# Patient Record
Sex: MALE | Race: WHITE | NOT HISPANIC OR LATINO | Employment: FULL TIME | URBAN - METROPOLITAN AREA
[De-identification: names, ages, dates, MRNs, and addresses within clinical notes are randomized per-mention and may not be internally consistent; named-entity substitution may affect disease eponyms.]

---

## 2023-10-05 ENCOUNTER — OFFICE VISIT (OUTPATIENT)
Dept: PRIMARY CARE | Facility: CLINIC | Age: 58
End: 2023-10-05
Payer: COMMERCIAL

## 2023-10-05 ENCOUNTER — HOSPITAL ENCOUNTER (OUTPATIENT)
Dept: VASCULAR MEDICINE | Facility: HOSPITAL | Age: 58
Discharge: HOME | End: 2023-10-05
Payer: COMMERCIAL

## 2023-10-05 VITALS — WEIGHT: 258.8 LBS | BODY MASS INDEX: 41.15 KG/M2 | SYSTOLIC BLOOD PRESSURE: 130 MMHG | DIASTOLIC BLOOD PRESSURE: 84 MMHG

## 2023-10-05 DIAGNOSIS — M79.89 SWELLING OF EXTREMITY, LEFT: Primary | ICD-10-CM

## 2023-10-05 DIAGNOSIS — M79.89 SWELLING OF EXTREMITY, LEFT: ICD-10-CM

## 2023-10-05 PROBLEM — E78.5 ELEVATED LIPIDS: Status: ACTIVE | Noted: 2023-10-05

## 2023-10-05 PROBLEM — B02.9 SHINGLES RASH: Status: ACTIVE | Noted: 2023-10-05

## 2023-10-05 PROBLEM — M10.9 GOUT: Status: ACTIVE | Noted: 2023-10-05

## 2023-10-05 PROBLEM — R79.89 LOW SERUM TESTOSTERONE: Status: ACTIVE | Noted: 2023-10-05

## 2023-10-05 PROBLEM — R06.83 SNORING: Status: ACTIVE | Noted: 2023-10-05

## 2023-10-05 PROBLEM — H91.90 HEARING LOSS: Status: ACTIVE | Noted: 2023-10-05

## 2023-10-05 PROBLEM — M19.079 OSTEOARTHRITIS OF SUBTALAR JOINT: Status: ACTIVE | Noted: 2023-10-05

## 2023-10-05 PROBLEM — M76.829 PTTD (POSTERIOR TIBIAL TENDON DYSFUNCTION): Status: ACTIVE | Noted: 2023-10-05

## 2023-10-05 PROBLEM — M77.9 BONY SPUR: Status: ACTIVE | Noted: 2023-10-05

## 2023-10-05 PROBLEM — R12 HEARTBURN: Status: ACTIVE | Noted: 2023-10-05

## 2023-10-05 PROBLEM — K11.20 PAROTIDITIS: Status: ACTIVE | Noted: 2023-10-05

## 2023-10-05 PROBLEM — M25.579 ANKLE PAIN: Status: ACTIVE | Noted: 2023-10-05

## 2023-10-05 PROBLEM — M19.079 ARTHRITIS OF FOOT: Status: ACTIVE | Noted: 2023-10-05

## 2023-10-05 PROBLEM — R09.81 CONGESTED NOSE: Status: ACTIVE | Noted: 2023-10-05

## 2023-10-05 PROBLEM — H90.3 HEARING LOSS, SENSORINEURAL, ASYMMETRICAL: Status: ACTIVE | Noted: 2023-10-05

## 2023-10-05 PROCEDURE — 99215 OFFICE O/P EST HI 40 MIN: CPT | Performed by: INTERNAL MEDICINE

## 2023-10-05 PROCEDURE — 93971 EXTREMITY STUDY: CPT | Performed by: SURGERY

## 2023-10-05 PROCEDURE — 93971 EXTREMITY STUDY: CPT

## 2023-10-05 RX ORDER — APIXABAN 5 MG (74)
KIT ORAL
Qty: 74 TABLET | Refills: 0 | Status: SHIPPED | OUTPATIENT
Start: 2023-10-05

## 2023-10-05 RX ORDER — ATORVASTATIN CALCIUM 10 MG/1
1 TABLET, FILM COATED ORAL DAILY
COMMUNITY
Start: 2017-09-22 | End: 2024-03-07 | Stop reason: SDUPTHER

## 2023-10-05 NOTE — PROGRESS NOTES
Subjective   Patient ID: Rubio Huerta is a 58 y.o. male who presents for Knee Pain and Leg Pain.    HPI     LLE swelling x 2 weeks    LLE pain    Travel to [place] 2-3 weeks ago    Review of Systems      No Fever/chills/headaches/dizziness/chest pains/ cough/ shortness of breath/palpitations/Nausea/vomiting/diarrhea/ constipation/urine frequency/blood in urine.      Objective   Wt 117 kg (258 lb 12.8 oz)   BMI 41.15 kg/m²     POX=95%    Physical Exam    No JVP elevation. No palpable Lymph Nodes. No Thyromegaly    HEENT- Negative    CVS-NL S1/S2 . No MRG    Lungs-CTA. B/S= B/L    Abdomen-Soft, Non-tender. No masses or HSM    Extremities: LLE swelling and calf tenderness      Assessment/Plan     LLE  pain & swelling x 2 weeks    DDX:    DVT/Thrombphlebitis    MSK    Plan:    Venous U/s LLE-R/o DVT    Elevation    ? ACT Rx    Follow up/ Call with any concerns    Addendum: Ultrasound left lower extremity consistent with DVT, involving the femoral and popliteal vein.  Start Eliquis 10 mg p.o. twice daily x1 week, and then Eliquis 5 mg twice daily for 3 months duration.  Will discuss with vascular surgery regarding further work-up, including follow-up ultrasound study prior to travel.

## 2023-10-06 ENCOUNTER — TELEPHONE (OUTPATIENT)
Dept: PRIMARY CARE | Facility: CLINIC | Age: 58
End: 2023-10-06
Payer: COMMERCIAL

## 2023-10-06 DIAGNOSIS — I82.412 ACUTE DEEP VEIN THROMBOSIS (DVT) OF FEMORAL VEIN OF LEFT LOWER EXTREMITY (MULTI): Primary | ICD-10-CM

## 2023-10-06 NOTE — TELEPHONE ENCOUNTER
Outgoing Rubio Huerta (Self) [phone] (Mobile) Remove   T/c with PT regarding DVT Dx- Started on Eliquis yesterday. Recommend U/s LLE in 2 weeks prior to travel. Discussed with vascular surgery yesterday.

## 2023-10-13 ENCOUNTER — APPOINTMENT (OUTPATIENT)
Dept: PRIMARY CARE | Facility: CLINIC | Age: 58
End: 2023-10-13
Payer: COMMERCIAL

## 2023-10-18 ENCOUNTER — HOSPITAL ENCOUNTER (OUTPATIENT)
Dept: VASCULAR MEDICINE | Facility: HOSPITAL | Age: 58
Discharge: HOME | End: 2023-10-18
Payer: COMMERCIAL

## 2023-10-18 DIAGNOSIS — M79.605 PAIN IN LEFT LEG: ICD-10-CM

## 2023-10-18 DIAGNOSIS — I82.412 ACUTE DEEP VEIN THROMBOSIS (DVT) OF FEMORAL VEIN OF LEFT LOWER EXTREMITY (MULTI): ICD-10-CM

## 2023-10-18 DIAGNOSIS — M79.89 OTHER SPECIFIED SOFT TISSUE DISORDERS: ICD-10-CM

## 2023-10-18 PROCEDURE — 93971 EXTREMITY STUDY: CPT

## 2023-10-18 PROCEDURE — 93971 EXTREMITY STUDY: CPT | Performed by: INTERNAL MEDICINE

## 2023-11-03 DIAGNOSIS — M79.89 SWELLING OF EXTREMITY, LEFT: ICD-10-CM

## 2023-11-07 DIAGNOSIS — Z00.00 HEALTH MAINTENANCE EXAMINATION: ICD-10-CM

## 2023-12-20 ENCOUNTER — NUTRITION (OUTPATIENT)
Dept: PRIMARY CARE | Facility: CLINIC | Age: 58
End: 2023-12-20
Payer: COMMERCIAL

## 2023-12-20 ENCOUNTER — OFFICE VISIT (OUTPATIENT)
Dept: PRIMARY CARE | Facility: CLINIC | Age: 58
End: 2023-12-20
Payer: COMMERCIAL

## 2023-12-20 ENCOUNTER — HOSPITAL ENCOUNTER (OUTPATIENT)
Dept: VASCULAR MEDICINE | Facility: HOSPITAL | Age: 58
Discharge: HOME | End: 2023-12-20
Payer: COMMERCIAL

## 2023-12-20 ENCOUNTER — ALLIED HEALTH (OUTPATIENT)
Dept: INTEGRATIVE MEDICINE | Facility: CLINIC | Age: 58
End: 2023-12-20
Payer: COMMERCIAL

## 2023-12-20 ENCOUNTER — HOSPITAL ENCOUNTER (OUTPATIENT)
Dept: RADIOLOGY | Facility: HOSPITAL | Age: 58
Discharge: HOME | End: 2023-12-20
Payer: COMMERCIAL

## 2023-12-20 ENCOUNTER — APPOINTMENT (OUTPATIENT)
Dept: RADIOLOGY | Facility: HOSPITAL | Age: 58
End: 2023-12-20
Payer: COMMERCIAL

## 2023-12-20 ENCOUNTER — HOSPITAL ENCOUNTER (OUTPATIENT)
Dept: CARDIOLOGY | Facility: HOSPITAL | Age: 58
Discharge: HOME | End: 2023-12-20
Payer: COMMERCIAL

## 2023-12-20 VITALS
HEART RATE: 72 BPM | BODY MASS INDEX: 40.81 KG/M2 | SYSTOLIC BLOOD PRESSURE: 118 MMHG | WEIGHT: 260 LBS | OXYGEN SATURATION: 95 % | DIASTOLIC BLOOD PRESSURE: 76 MMHG | HEIGHT: 67 IN

## 2023-12-20 DIAGNOSIS — Z00.00 HEALTH MAINTENANCE EXAMINATION: Primary | ICD-10-CM

## 2023-12-20 DIAGNOSIS — Z00.00 HEALTH MAINTENANCE EXAMINATION: ICD-10-CM

## 2023-12-20 DIAGNOSIS — Z00.00 HEALTHCARE MAINTENANCE: Primary | ICD-10-CM

## 2023-12-20 LAB
25(OH)D3 SERPL-MCNC: 39 NG/ML (ref 30–100)
ALBUMIN SERPL BCP-MCNC: 4.3 G/DL (ref 3.4–5)
ALP SERPL-CCNC: 41 U/L (ref 33–120)
ALT SERPL W P-5'-P-CCNC: 46 U/L (ref 10–52)
ANION GAP SERPL CALC-SCNC: 13 MMOL/L (ref 10–20)
AST SERPL W P-5'-P-CCNC: 38 U/L (ref 9–39)
BASOPHILS # BLD AUTO: 0.03 X10*3/UL (ref 0–0.1)
BASOPHILS NFR BLD AUTO: 0.6 %
BILIRUB SERPL-MCNC: 0.4 MG/DL (ref 0–1.2)
BUN SERPL-MCNC: 27 MG/DL (ref 6–23)
CALCIUM SERPL-MCNC: 8.9 MG/DL (ref 8.6–10.3)
CHLORIDE SERPL-SCNC: 107 MMOL/L (ref 98–107)
CHOLEST SERPL-MCNC: 155 MG/DL (ref 0–199)
CHOLESTEROL/HDL RATIO: 5.8
CO2 SERPL-SCNC: 23 MMOL/L (ref 21–32)
CREAT SERPL-MCNC: 1.06 MG/DL (ref 0.5–1.3)
CRP SERPL HS-MCNC: 1.3 MG/L
EOSINOPHIL # BLD AUTO: 0.06 X10*3/UL (ref 0–0.7)
EOSINOPHIL NFR BLD AUTO: 1.3 %
ERYTHROCYTE [DISTWIDTH] IN BLOOD BY AUTOMATED COUNT: 13.7 % (ref 11.5–14.5)
EST. AVERAGE GLUCOSE BLD GHB EST-MCNC: 111 MG/DL
FERRITIN SERPL-MCNC: 298 NG/ML (ref 20–300)
GFR SERPL CREATININE-BSD FRML MDRD: 81 ML/MIN/1.73M*2
GLUCOSE SERPL-MCNC: 99 MG/DL (ref 74–99)
HBA1C MFR BLD: 5.5 %
HCT VFR BLD AUTO: 46 % (ref 41–52)
HDLC SERPL-MCNC: 26.7 MG/DL
HGB BLD-MCNC: 15.4 G/DL (ref 13.5–17.5)
IMM GRANULOCYTES # BLD AUTO: 0.01 X10*3/UL (ref 0–0.7)
IMM GRANULOCYTES NFR BLD AUTO: 0.2 % (ref 0–0.9)
INSULIN P FAST SERPL-ACNC: 38 UIU/ML (ref 3–25)
IRON SATN MFR SERPL: 27 % (ref 25–45)
IRON SERPL-MCNC: 93 UG/DL (ref 35–150)
LDLC SERPL CALC-MCNC: 94 MG/DL
LYMPHOCYTES # BLD AUTO: 1.97 X10*3/UL (ref 1.2–4.8)
LYMPHOCYTES NFR BLD AUTO: 41.4 %
MAGNESIUM SERPL-MCNC: 2.2 MG/DL (ref 1.6–2.4)
MCH RBC QN AUTO: 30.7 PG (ref 26–34)
MCHC RBC AUTO-ENTMCNC: 33.5 G/DL (ref 32–36)
MCV RBC AUTO: 92 FL (ref 80–100)
MONOCYTES # BLD AUTO: 0.31 X10*3/UL (ref 0.1–1)
MONOCYTES NFR BLD AUTO: 6.5 %
NEUTROPHILS # BLD AUTO: 2.38 X10*3/UL (ref 1.2–7.7)
NEUTROPHILS NFR BLD AUTO: 50 %
NON HDL CHOLESTEROL: 128 MG/DL (ref 0–149)
NRBC BLD-RTO: 0 /100 WBCS (ref 0–0)
PLATELET # BLD AUTO: 173 X10*3/UL (ref 150–450)
POC APPEARANCE, URINE: CLEAR
POC BILIRUBIN, URINE: NEGATIVE
POC BLOOD, URINE: NEGATIVE
POC COLOR, URINE: YELLOW
POC GLUCOSE, URINE: NEGATIVE MG/DL
POC KETONES, URINE: NEGATIVE MG/DL
POC LEUKOCYTES, URINE: NEGATIVE
POC NITRITE,URINE: NEGATIVE
POC PH, URINE: 6 PH
POC PROTEIN, URINE: NEGATIVE MG/DL
POC SPECIFIC GRAVITY, URINE: 1.02
POC UROBILINOGEN, URINE: 0.2 EU/DL
POTASSIUM SERPL-SCNC: 4.2 MMOL/L (ref 3.5–5.3)
PROT SERPL-MCNC: 6.6 G/DL (ref 6.4–8.2)
PSA SERPL-MCNC: 2.46 NG/ML
RBC # BLD AUTO: 5.02 X10*6/UL (ref 4.5–5.9)
SODIUM SERPL-SCNC: 139 MMOL/L (ref 136–145)
TIBC SERPL-MCNC: 340 UG/DL (ref 240–445)
TRIGL SERPL-MCNC: 171 MG/DL (ref 0–149)
TSH SERPL-ACNC: 2.99 MIU/L (ref 0.44–3.98)
UIBC SERPL-MCNC: 247 UG/DL (ref 110–370)
URATE SERPL-MCNC: 7.2 MG/DL (ref 4–7.5)
VIT B12 SERPL-MCNC: 624 PG/ML (ref 211–911)
VLDL: 34 MG/DL (ref 0–40)
WBC # BLD AUTO: 4.8 X10*3/UL (ref 4.4–11.3)

## 2023-12-20 PROCEDURE — 76706 US ABDL AORTA SCREEN AAA: CPT

## 2023-12-20 PROCEDURE — SMAR2 SMART-UHCSM: Performed by: PHYSICIAN ASSISTANT

## 2023-12-20 PROCEDURE — EXAM4 EXAM 4: Performed by: INTERNAL MEDICINE

## 2023-12-20 PROCEDURE — 93016 CV STRESS TEST SUPVJ ONLY: CPT | Performed by: INTERNAL MEDICINE

## 2023-12-20 PROCEDURE — 84550 ASSAY OF BLOOD/URIC ACID: CPT

## 2023-12-20 PROCEDURE — 82270 OCCULT BLOOD FECES: CPT | Performed by: INTERNAL MEDICINE

## 2023-12-20 PROCEDURE — 86141 C-REACTIVE PROTEIN HS: CPT

## 2023-12-20 PROCEDURE — 83540 ASSAY OF IRON: CPT

## 2023-12-20 PROCEDURE — 80053 COMPREHEN METABOLIC PANEL: CPT

## 2023-12-20 PROCEDURE — NUTCO NUTRITION CONSULTATION: Performed by: DIETITIAN, REGISTERED

## 2023-12-20 PROCEDURE — 76706 US ABDL AORTA SCREEN AAA: CPT | Performed by: INTERNAL MEDICINE

## 2023-12-20 PROCEDURE — 93017 CV STRESS TEST TRACING ONLY: CPT

## 2023-12-20 PROCEDURE — 36415 COLL VENOUS BLD VENIPUNCTURE: CPT

## 2023-12-20 PROCEDURE — 93880 EXTRACRANIAL BILAT STUDY: CPT | Mod: 52

## 2023-12-20 PROCEDURE — 83735 ASSAY OF MAGNESIUM: CPT

## 2023-12-20 PROCEDURE — 82306 VITAMIN D 25 HYDROXY: CPT

## 2023-12-20 PROCEDURE — 83525 ASSAY OF INSULIN: CPT

## 2023-12-20 PROCEDURE — 82728 ASSAY OF FERRITIN: CPT

## 2023-12-20 PROCEDURE — 84153 ASSAY OF PSA TOTAL: CPT

## 2023-12-20 PROCEDURE — 84402 ASSAY OF FREE TESTOSTERONE: CPT

## 2023-12-20 PROCEDURE — 83036 HEMOGLOBIN GLYCOSYLATED A1C: CPT

## 2023-12-20 PROCEDURE — BOMIX BODY MASS INDEX: Performed by: INTERNAL MEDICINE

## 2023-12-20 PROCEDURE — 71046 X-RAY EXAM CHEST 2 VIEWS: CPT | Performed by: RADIOLOGY

## 2023-12-20 PROCEDURE — BODTM BONE DENSITY TESTING/MEDISCAN: Performed by: INTERNAL MEDICINE

## 2023-12-20 PROCEDURE — 93880 EXTRACRANIAL BILAT STUDY: CPT | Mod: REDUCED SERVICES | Performed by: INTERNAL MEDICINE

## 2023-12-20 PROCEDURE — 93018 CV STRESS TEST I&R ONLY: CPT | Performed by: INTERNAL MEDICINE

## 2023-12-20 PROCEDURE — 84443 ASSAY THYROID STIM HORMONE: CPT

## 2023-12-20 PROCEDURE — 81002 URINALYSIS NONAUTO W/O SCOPE: CPT | Performed by: INTERNAL MEDICINE

## 2023-12-20 PROCEDURE — 82607 VITAMIN B-12: CPT

## 2023-12-20 PROCEDURE — 71046 X-RAY EXAM CHEST 2 VIEWS: CPT

## 2023-12-20 PROCEDURE — 83550 IRON BINDING TEST: CPT

## 2023-12-20 PROCEDURE — 85025 COMPLETE CBC W/AUTO DIFF WBC: CPT

## 2023-12-20 PROCEDURE — 80061 LIPID PANEL: CPT

## 2023-12-20 ASSESSMENT — PAIN SCALES - GENERAL: PAINLEVEL: 0-NO PAIN

## 2023-12-20 NOTE — PROGRESS NOTES
Reason for Nutrition Visit:  It was a pleasure speaking with Mr. Huerta again to discuss diet and nutrition as part of his executive physical follow-up.    Past Medical Hx:  Patient Active Problem List   Diagnosis    Ankle pain    Arthritis of foot    Bony spur    Congested nose    Elevated lipids    Gout    Hearing loss    Hearing loss, sensorineural, asymmetrical    Heartburn    Low serum testosterone    Osteoarthritis of subtalar joint    Parotiditis    PTTD (posterior tibial tendon dysfunction)    Shingles rash    Snoring    Toe swelling        Weight change:  His weight has been stable around 260lbs for the past 8-10 months.  He would like to lose 20lbs and get closer to 240lbs.    Significant Weight Change: No    Lab Results   Component Value Date    HGBA1C 6.0 (A) 10/27/2022    CHOL 155 12/20/2023    LDLCALC 94 12/20/2023    TRIG 171 (H) 12/20/2023    HDL 26.7 12/20/2023        Food and Nutrition Hx:  He has been traveling extensively internationally over the past year which has meant less exercise.  He travels internationally at least once a month.  He is in the office one week a month.  He also travels 3 out of 4 weeks a month to visit his children who are in college.  With so much work travel he does eat late dinners around 8-9pm.      He tries to limit his carbohydrates such as breads and pastas.  He tries to limit eating out when he is not traveling for work.  When he does travel he brings the snacks that are listed below.      He takes Ushi Fiber fiber supplement.  He takes 2 scoops of the powder when he is not traveling which provides 10g fiber.  When he travels he brings the capsules which are only 2g fiber (for 5 capsules).    Typical Food Intake:  Breakfast (9-10am): 2-3 eggs, avocado, turkey (Charles Stan, 2 patties is 13g protein) or chicken sausage   Lunch (11:30-12:00pm): salad with chicken or fish, oil and vinegar  Dinner (7:00pm): protein (8oz) and veggie  Snacks (2-4 per day): sunflower or  pumpkin seeds; Chomps meat sticks; Brami lupini beans; pickles; veggies  Has dark chocolate for dessert    Beverages: decaf green tea or coffee-1 cup per day; sparkling water-3-4 cans/day; water-24-32oz a day    Allergies: None  Intolerance: None    GI Symptoms : None He has a bowel movement everyday.    Exercise: 3-4 days a week, 45 minutes on Peloton bike or 45 minutes of elliptical.  He has not been strength training as much.  He used to incorporate strength training twice a week.      Sleep duration/quality : 7 hours on average.  No issues falling asleep.  He wakes up 1-2 times a night to use the bathroom.  40% of the time falling back to sleep is difficult    Supplements: Vitamin D, cinnamon, Liver GI Detox, Adrest, Stress B, melatonin, Cortisol Manager, Thercumin, Niacin CRT, Nordic Naturals Pro Omega 2000  daily    Cravings: Sweet  Energy Levels: High    Estimated Energy Needs:    Weight Maintanence: 8111-4167 kcal/day ([place] Jeor x 1.3-1.4 activity factor)  Weight Loss Needs: 5215-8705 kcal/day  Protein Needs: 140-160g/day (0.6-0.7g/lb DBW, 235lbs)    Nutrition Diagnosis:    Diagnosis Statement 1:  Diagnosis Status: Ongoing  Diagnosis : Altered nutrition related lab values  related to  dietary and physiological factors  as evidenced by  low HDL (26.7mg/dl), elevated triglycerides (171mg/dl) , elevated fasting insulin (38)    Diagnosis Statement 2:  Diagnosis Status: New  Diagnosis : Inadequate fiber intake  related to  food- and nutrition-related knowledge deficit regarding appropriate fiber intake  as evidenced by  food recall showing he is not meeting the recommended 35-40g per day, especially when traveling as he is not getting the additional 10g of fiber he usually gets with the powdered fiber supplement he takes at home-he uses the capsules when traveling which is only 2g fiber.    Diagnosis Statement 3:   Diagnosis Status: New  Diagnosis : Inadequate protein intake  related to  food- and  "nutrition-related knowledge deficit regarding appropriate protein intake  as evidenced by  food recall showing he is not meeting the recommended 140-160g protein per day.    Nutrition Interventions:  Increased Fiber Diet and Increased Protein Diet  Nutrition Counseling: Goal Setting    Nutrition Goals:  Nutrition Goals : Blood glucose control  Initiate Exercise Regimen  Reduce Hgb A1c  Reduce Triglyceride level  Adequate fiber intake  Adequate protein intake    Nutrition Recommendations:    1) Consider tracking your food intake using the [employer] karma, focusing primarily on your protein and fiber intake to see if you are consistently hitting those goals.    2) Ensure you are getting adequate amounts of protein consistently throughout the day.  Your daily protein goal is 140-160g.  Aim for 40-50g (6-8oz lean animal protein) protein at each meal.  Continue to incorporate protein with snacks (10-20g at each snack).    3) Aim for 35-40g fiber daily.  Continue to use the [employer] fiber supplement.  Taking 2 scoops a day will provide 10g protein.  Consider bringing the individual packets with you when traveling rather than the capsules as the capsules only provide 2g fiber per 5 capsules.    4) Additional lifestyle modifications to help with blood sugar regulation:  -take 1 Tbsp of apple cider vinegar before each meal  -be sure your overnight fasting window (length of time you are not eating overnight) is at least 12-14 hours.  If you eat a late dinner, you may need to push back breakfast the next morning to 9 or 10am.  -space out meals and snacks throughout the day every 3.5-4 hours.  Avoid grazing.    5) Incorporate strength training at least 2-3 days a week.  I would recommend reading the book \"Forever Strong\" which explains in detail the importance of building muscle as we get older, which is a result of strength training along with prioritizing protein in our diet.    6) It may be helpful to use a continuous glucose " monitor to have a better idea of how your body is responding to certain foods, quantities and timing of meals.  You also mentioned that you are going to further discuss medications such as Ozempic with Dr. Allan.

## 2023-12-20 NOTE — PATIENT INSTRUCTIONS
Stress management:  Goal of using breathwork before each meal.  “4-6 breath” Inhale through the nose for 4 seconds, exhale through pursed lips for 6 seconds. Repeat for 4 breath cycles.  Affirmations  Bring more movement into your day  Travel with resistance bands  5 minute workout- [email or web address]/[email or web address]-[email or web address]-blast-[email or web address]   Work on sleep optimization:  Reduce melatonin to 3 mg before bed. Use 1 mg if you wake up in the middle of the night and cannot fall back asleep. Use this before 3:30am.  This is indicated for temporary use while you are retraining your body to stay asleep.  To prevent mind racing in the middle of the night, use the 5 Senses Grounding practice. Picture your favorite place in the world and name:  5 things you see  4 things you hear  3 things you feel  2 things you smell  1 thing you taste  See Sleep Hygiene handout for more information. Follow these recommendations closely.  References:  Limitless book by Miguel Bermudez

## 2023-12-20 NOTE — PROGRESS NOTES
59 y/o male presents to [facility] for stress management and resilience training in coordination with  Executive Health. Lives with wife, Peg. 5 kids.     Goals from last year:  - [employer] karma- tried this for a short time, unsure if it had an impact  - Sleep- still a challengs    Updates:  - Travel has increased- international 1 week/month, domestic 1 week/month   - Sleep- wakes up 1x/night, occ difficulty falling back asleep; worsens when he is on the road-- reviewed potential causes. Uses 4 mg melatonin.   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 Sunday- [employer], relaxing, family outing. Monday- office. Tues-Thurs- travel. Fri- office. Sat- house chores. Internationally, travels Sun-Sat. When home, evenings- workouts, then relaxes with wife and/or son (senior). Enjoys cooking, golfing, supports son in sports and school, reading.     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 averages 6.5-7 hours per night, bed 10pm, wakes up 530/6. Sleep onset- minimal. Maintenance- nocturia, tossing and turning afterward for few hours- 1-2x per week. Sleep aids- melatonin, cortisol manager, zinc. Avoids screens before bed.   - caffeine- minimal   - water- plenty  - etoh- none during the week; 1-3 on Fri and Sat  - sugar- 2-3x per week- ice cream, cookies.      Current stress  management strategies: cooking     Discussed physiologic changes that occur with acute vs. chronic stress, the autonomic nervous system, evidence re: neuroplasticity of mindfulness, and different mindfulness practices. Also discussed lifestyle habits that support the Body's natural defense mechanisms.  - interested in mindful eating and meditation when flyin

## 2023-12-20 NOTE — PROGRESS NOTES
Executive Physical         Patient ID: Rubio Huerta is a 58 y.o. male who presents for Executive Evaluation:    The following report is in reference to your  executive physical examination which was held at [facility] on 12/20/23. Firstly, let me state that it was a pleasure meeting with you and that we appreciate you coming to [facility] for your executive evaluation.    At the time of your evaluation you were feeling well with no significant health concerns.    You were not complaining of fever ,chills, headache ,dizziness ,cough, chest pain shortness of breath, palpitations, nausea, vomiting, abdominal pain, loss of appetite, diarrhea, blood in the stools, frequent urination or painful urination.    Past Medical History:   Diagnosis Date    DVT (deep venous thrombosis) (CMS/[facility])     Other abnormal glucose     Elevated glucose    Personal history of other diseases of the musculoskeletal system and connective tissue     History of gout    Personal history of other drug therapy     History of influenza vaccination    Personal history of other infectious and parasitic diseases     History of herpes zoster         Review of Systems   Constitutional: Negative.    HENT: Negative.     Eyes: Negative.    Respiratory: Negative.     Cardiovascular: Negative.    Gastrointestinal: Negative.    Endocrine: Negative.    Genitourinary: Negative.    Skin: Negative.    Allergic/Immunologic: Negative.    Neurological: Negative.    Hematological: Negative.    Psychiatric/Behavioral: Negative.     All other systems reviewed and are negative.          Patient Active Problem List   Diagnosis    Ankle pain    Arthritis of foot    Bony spur    Congested nose    Elevated lipids    Gout    Hearing loss    Hearing loss, sensorineural, asymmetrical    Heartburn    Low serum testosterone    Osteoarthritis of subtalar joint    Parotiditis    PTTD (posterior tibial tendon  "dysfunction)    Shingles rash    Snoring    Toe swelling        Past Surgical History:   Procedure Laterality Date    COLONOSCOPY  09/22/2017    Complete Colonoscopy        Family History   Problem Relation Name Age of Onset    Heart disease Father          No Known Allergies       Current Outpatient Medications:     apixaban (Eliquis DVT-PE Treat 30D Start) 5 mg (74 tabs) tablets,dose pack, 10 mg BID X 7 days, followed by 5 mg BID, Disp: 74 tablet, Rfl: 0    apixaban (Eliquis) 5 mg tablet, Take 1 tablet (5 mg) by mouth 2 times a day., Disp: 180 tablet, Rfl: 0    atorvastatin (Lipitor) 10 mg tablet, Take 1 tablet (10 mg) by mouth once daily., Disp: , Rfl:      Visit Vitals  /76 (BP Location: Right arm, Patient Position: Sitting, BP Cuff Size: Large adult)   Pulse 72   Ht 1.702 m (5' 7\")   Wt 118 kg (260 lb)   SpO2 95%   BMI 40.72 kg/m²   Smoking Status Former   BSA 2.36 m²        Physical Exam  Constitutional:       Appearance: Normal appearance.   HENT:      Head: Normocephalic and atraumatic.      Right Ear: Tympanic membrane, ear canal and external ear normal.      Left Ear: Tympanic membrane, ear canal and external ear normal.      Nose: Nose normal.      Mouth/Throat:      Mouth: Mucous membranes are moist.   Eyes:      Extraocular Movements: Extraocular movements intact.      Conjunctiva/sclera: Conjunctivae normal.      Pupils: Pupils are equal, round, and reactive to light.   Cardiovascular:      Rate and Rhythm: Normal rate and regular rhythm.      Pulses: Normal pulses.      Heart sounds: Normal heart sounds.   Pulmonary:      Effort: Pulmonary effort is normal.      Breath sounds: Normal breath sounds.   Abdominal:      General: Abdomen is flat. Bowel sounds are normal.      Palpations: Abdomen is soft.   Genitourinary:     Penis: Normal.       Testes: Normal.      Prostate: Normal.      Rectum: Normal.   Musculoskeletal:         General: Normal range of motion.      Cervical back: Normal range of " motion.   Skin:     General: Skin is warm.   Neurological:      General: No focal deficit present.      Mental Status: He is alert and oriented to person, place, and time.   Psychiatric:         Mood and Affect: Mood normal.         Behavior: Behavior normal.     Rash below L breast tissue.        Results for orders placed or performed in visit on 12/20/23 (from the past 24 hour(s))   CBC and Auto Differential   Result Value Ref Range    WBC 4.8 4.4 - 11.3 x10*3/uL    nRBC 0.0 0.0 - 0.0 /100 WBCs    RBC 5.02 4.50 - 5.90 x10*6/uL    Hemoglobin 15.4 13.5 - 17.5 g/dL    Hematocrit 46.0 41.0 - 52.0 %    MCV 92 80 - 100 fL    MCH 30.7 26.0 - 34.0 pg    MCHC 33.5 32.0 - 36.0 g/dL    RDW 13.7 11.5 - 14.5 %    Platelets 173 150 - 450 x10*3/uL    Neutrophils % 50.0 40.0 - 80.0 %    Immature Granulocytes %, Automated 0.2 0.0 - 0.9 %    Lymphocytes % 41.4 13.0 - 44.0 %    Monocytes % 6.5 2.0 - 10.0 %    Eosinophils % 1.3 0.0 - 6.0 %    Basophils % 0.6 0.0 - 2.0 %    Neutrophils Absolute 2.38 1.20 - 7.70 x10*3/uL    Immature Granulocytes Absolute, Automated 0.01 0.00 - 0.70 x10*3/uL    Lymphocytes Absolute 1.97 1.20 - 4.80 x10*3/uL    Monocytes Absolute 0.31 0.10 - 1.00 x10*3/uL    Eosinophils Absolute 0.06 0.00 - 0.70 x10*3/uL    Basophils Absolute 0.03 0.00 - 0.10 x10*3/uL   POCT UA (nonautomated) manually resulted   Result Value Ref Range    POC Color, Urine Yellow Straw, Yellow, Light-Yellow    POC Appearance, Urine Clear Clear    POC Glucose, Urine NEGATIVE NEGATIVE mg/dl    POC Bilirubin, Urine NEGATIVE NEGATIVE    POC Ketones, Urine NEGATIVE NEGATIVE mg/dl    POC Specific Gravity, Urine 1.020 1.005 - 1.035    POC Blood, Urine NEGATIVE NEGATIVE    POC PH, Urine 6.0 No Reference Range Established PH    POC Protein, Urine NEGATIVE NEGATIVE, 30 (1+) mg/dl    POC Urobilinogen, Urine 0.2 0.2, 1.0 EU/DL    Poc Nitrite, Urine NEGATIVE NEGATIVE    POC Leukocytes, Urine NEGATIVE NEGATIVE                    Assessment/Plan            Discussion/Summary  In summary you are 58 y.o.  male  with a history of venous thrombosis,elevated BMI and gout. At the time of your evaluation you were feeling well with no significant health concerns.  Past Medical History:   Diagnosis Date    DVT (deep venous thrombosis) (CMS/HCC)     Other abnormal glucose     Elevated glucose    Personal history of other diseases of the musculoskeletal system and connective tissue     History of gout    Personal history of other drug therapy     History of influenza vaccination    Personal history of other infectious and parasitic diseases     History of herpes zoster     Lab studies including complete blood count, comprehensive metabolic panel, fasting lipid panel, thyroid function, specific antigen PSA, Vitamin D, B 12, testosterone levels and inflammatory markers were normal.      Elevated Insulin levels=38 ( 3-25)     Cardiology- Normal EKG, CT-Cardiac score, Exercise stress test, Carotid and Abdominal Aorta ultrasound studies.     Elevated BMI- - Increase fish/fiber and limit processed/refined carbohydrates and added sugars. Increased physical activity to a minimum of 150 minutes of moderate exercise per week. Consider weekly Semaglutide Rx to assist with weight loss.    Left lower extremity venous thrombosis ( DVT) - Discussed with vascular surgery who recommend that you can stop Eliquis anticoagulation therapy after three months. Recommend use of Eliquis prior to long distance travel.    L breast Intertrigo- Trial of Lotrisone cream BID.  Please follow up with dermatology for annual examination.Please use a broad-spectrum sunscreen with an SPF of 30 or higher. Monitor moles for ABCDE ( asymmetry, border irregularity, color changes, diameter> pencil eraser and evolving )     Elevated Insulin levels=38 ( 3-25) - this is consistent with Insulin resistance. I would like you to start Metformin  mg 1-2 Tabs daily to help lower Insulin levels.    Cancer  screening- you are current with colonoscopy,prostate and lung cancer screening tests.     Vaccine-I would  recommend a shingles (Shingrix) vaccine at age 50  onwards.  In addition I would recommend a tetanus booster every 10 years to maintain immunity.  Recommend RSV at age 60 and  pneumonia vaccine (Prevnar 20) at age  65.  Consider COVID-19 booster and  flu shot this fall.           Vaccine- I would  recommend a shingles (Shingrix) vaccine if not yet completed.  In addition I recommend a tetanus booster every 10 years to maintain immunity.  Recommend pneumonia vaccine (Prevnar 20) at age  65.  Flu vaccine done. Consider COVID-19 booster.      Wellness : Please continue with a balanced diet and a regular physical activity program for at least 150 minutes/week of moderate exercise and 30 minutes/week of resistance/weight training per week.  Try to get 6 to 8 hours of sleep per night.  Please download the calm or headspace mai from the [employer] to assist with stress and sleep management if necessary.    In conclusion,  I wish to thank you for attending the  executive health program at [facility].  I wish you and your family a safe and healthy New Year.    Please email me at [email or web address] or call me at [phone] if you have any questions pertaining to this report or any other medical concerns.    Be Well    Dr ESPERANZA Malone and Jill Quintero Master Clinician in Wellness    Senior Attending Physician , [facility]    [facility]    Clinical     [employer]    [place], [place]    This note was partially generated using the Dragon voice recognition system.  There may be some incorrect wording ,grammar, spelling or punctuation errors that were not corrected prior to committing the note to the medical record.

## 2023-12-21 DIAGNOSIS — Z00.00 ANNUAL PHYSICAL EXAM: Primary | ICD-10-CM

## 2023-12-21 DIAGNOSIS — E78.1 HIGH BLOOD TRIGLYCERIDES: ICD-10-CM

## 2023-12-24 LAB
TESTOSTERONE FREE (CHAN): 68.7 PG/ML (ref 35–155)
TESTOSTERONE,TOTAL,LC-MS/MS: 354 NG/DL (ref 250–1100)

## 2023-12-25 DIAGNOSIS — R21 RASH: Primary | ICD-10-CM

## 2023-12-25 RX ORDER — CLOTRIMAZOLE AND BETAMETHASONE DIPROPIONATE 10; .64 MG/G; MG/G
1 CREAM TOPICAL 2 TIMES DAILY
Qty: 45 G | Refills: 0 | Status: SHIPPED | OUTPATIENT
Start: 2023-12-25 | End: 2024-02-23

## 2023-12-26 ENCOUNTER — TELEPHONE (OUTPATIENT)
Dept: PRIMARY CARE | Facility: CLINIC | Age: 58
End: 2023-12-26
Payer: COMMERCIAL

## 2024-01-01 ASSESSMENT — ENCOUNTER SYMPTOMS
ALLERGIC/IMMUNOLOGIC NEGATIVE: 1
PSYCHIATRIC NEGATIVE: 1
ENDOCRINE NEGATIVE: 1
CONSTITUTIONAL NEGATIVE: 1
CARDIOVASCULAR NEGATIVE: 1
GASTROINTESTINAL NEGATIVE: 1
EYES NEGATIVE: 1
NEUROLOGICAL NEGATIVE: 1
RESPIRATORY NEGATIVE: 1
HEMATOLOGIC/LYMPHATIC NEGATIVE: 1

## 2024-01-24 ENCOUNTER — APPOINTMENT (OUTPATIENT)
Dept: AUDIOLOGY | Facility: CLINIC | Age: 59
End: 2024-01-24
Payer: COMMERCIAL

## 2024-02-15 ENCOUNTER — CLINICAL SUPPORT (OUTPATIENT)
Dept: AUDIOLOGY | Facility: CLINIC | Age: 59
End: 2024-02-15
Payer: COMMERCIAL

## 2024-02-15 DIAGNOSIS — H90.3 HEARING LOSS, SENSORINEURAL, ASYMMETRICAL: Primary | ICD-10-CM

## 2024-02-15 PROCEDURE — 92567 TYMPANOMETRY: CPT | Performed by: AUDIOLOGIST

## 2024-02-15 PROCEDURE — V5011 HEARING AID FITTING/CHECKING: HCPCS | Performed by: AUDIOLOGIST

## 2024-02-15 PROCEDURE — 92557 COMPREHENSIVE HEARING TEST: CPT | Performed by: AUDIOLOGIST

## 2024-02-15 NOTE — PROGRESS NOTES
AUDIOMETRIC EVALUATION       Name:  Rubio Huerta  :  1965  Age:  58 y.o.  Date of Evaluation:  2/15/2024     Ear Make and Model Serial Number /Tube size Dome Warranty Expiration   Right Phonak Audeo P90 R  [id number]  0 moderate Medium open 2024   Left Phonak Audeo P90 R  [id number]  0 moderate Medium open 2024       HISTORY  Rubio Huerta was seen today for a hearing evaluation due to known bilateral sensorineural hearing loss, worse in the left ear. Utilizes bilateral hearing aids with subjective benefit. Denies any concerns for hearing aid malfunction. Reports HAs were connected to both phones in the past but now will only connect to one phone.    Denies tinnitus, vertigo, aural pain, drainage, fullness, history of familial hearing loss or noise exposure.    PROCEDURE:  Otoscopic Evaluation:    RIGHT: Clear ear canal and tympanic membrane visualized.  LEFT:  Clear ear canal and tympanic membrane visualized.    Immittance: Tympanometry (226 Hz probe tone) and Stapedial Acoustic Reflexes Thresholds (ART)(Probe ear):  RIGHT: Normal middle ear pressure, mobility, and ear canal volume. Ipsilateral ART -2000 Hz.  LEFT: Normal middle ear pressure, mobility, and ear canal volume. Ipsilateral ART -2000 Hz.    Pure Tone and Speech Audiometry:    Test Technique: Pure Tone Audiometry via insert earphones  Test Reliability: good    RIGHT: Normal hearing through 1000 Hz sloping to moderately severe  sensorineural hearing loss through 8000 Hz.. Word Recognition score was excellent using recorded material (NU-6 25-word list).   LEFT:  Normal hearing through 1500 Hz sloping to profound  sensorineural hearing loss through 8000 Hz.. Word Recognition score was excellent using recorded material (NU-6 25-word list).     HEARING AID CHECK  Replaced domes and wax traps, bilaterally.    Cleaned battery contacts and microphones, bilaterally.  Listening check was adequate following clean and  "check, bilaterally.    Updated [employer]IO and [employer] with today's audiogram. Software update. Turned off tap control for Voice Assistance.  Increased gain in mid frequencies for right HA. Paired devices to both phones, demonstrated switching between phones.    EVALUATION  See scanned Audiogram in \"Media\".    IMPRESSIONS:  Today's test results indicate stable hearing as compared to previous testing.  Updated hearing aid software and disabled tap control for Voice Assist. Paired both devices to both phones. HA warranty expires in May, patient will return before then for a HA check to send HA to  for new batteries and clean/check.    RECOMMENDATIONS:  Continue medical follow-up with physician.  Return for audiologic assessment in conjunction with otologic care or annually.   Communication strategies were discussed (utilizing visual cues/gestures; reducing background noise and distance from desired source; increasing light to assist with visual cues; use of clear speech).  Continues use of binaural hearing aids during all waking moments.  Return in May, 2024 for a HA check and to send devices to  for clean check and a battery change.    PATIENT EDUCATION:   Discussed results and recommendations with Rubio Huerta.  Questions were addressed and the patient was encouraged to contact our department ([phone]) should concerns arise.    LOLITA Chinchilla, CCC-A  Senior Clinical Audiologist    TIME: [phone]    "

## 2024-03-07 DIAGNOSIS — E78.5 ELEVATED LIPIDS: ICD-10-CM

## 2024-03-07 RX ORDER — ATORVASTATIN CALCIUM 10 MG/1
10 TABLET, FILM COATED ORAL DAILY
Qty: 90 TABLET | Refills: 3 | Status: SHIPPED | OUTPATIENT
Start: 2024-03-07 | End: 2025-03-07

## 2024-05-15 ENCOUNTER — APPOINTMENT (OUTPATIENT)
Dept: AUDIOLOGY | Facility: CLINIC | Age: 59
End: 2024-05-15
Payer: COMMERCIAL

## 2024-05-30 ENCOUNTER — CLINICAL SUPPORT (OUTPATIENT)
Dept: AUDIOLOGY | Facility: CLINIC | Age: 59
End: 2024-05-30
Payer: COMMERCIAL

## 2024-05-30 DIAGNOSIS — H90.3 HEARING LOSS, SENSORINEURAL, ASYMMETRICAL: Primary | ICD-10-CM

## 2024-05-30 PROCEDURE — V5299 HEARING SERVICE: HCPCS | Performed by: AUDIOLOGIST

## 2024-05-30 NOTE — PROGRESS NOTES
"AUDIOMETRIC EVALUATION       Name:  Rubio Huerta  :  1965  Age:  58 y.o.  Date of Evaluation:  2024     Ear Make and Model Serial Number /Tube size Dome Warranty Expiration   Right Phonak Audeo P90 R  [id number]  0 moderate Medium open 2024   Left Phonak Audeo P90 R  [id number]  0 moderate Medium open 2024       HISTORY  Rubio Huerta was seen today for a hearing aid check. Reports significant battery drainage from both devices.    HEARING AID CHECK  Programmed loaner devices in [employer]. Paired to phone and ANN. Will send HAs to [email or web address] for repair in warranty. Warranty expires in , advised patient after that time, HA repairs will be a charge.    EVALUATION  See scanned Audiogram in \"Media\".    IMPRESSIONS:  Will send HAs for battery replacement in warranty. Programmed loaners, signed loaner agreement. Paired loaners to phone.    RECOMMENDATIONS:  Continue medical follow-up with physician.  Return for audiologic assessment in conjunction with otologic care or annually.   Communication strategies were discussed (utilizing visual cues/gestures; reducing background noise and distance from desired source; increasing light to assist with visual cues; use of clear speech).  Continues use of binaural hearing aids during all waking moments. Return loaners when bilateral HA repair return.    PATIENT EDUCATION:   Discussed results and recommendations with Rubio Huerta.  Questions were addressed and the patient was encouraged to contact our department ([phone]) should concerns arise.    LOLITA Chinchilla, CCC-A  Senior Clinical Audiologist    TIME: [phone]  "

## 2024-06-21 ENCOUNTER — DOCUMENTATION (OUTPATIENT)
Dept: AUDIOLOGY | Facility: CLINIC | Age: 59
End: 2024-06-21
Payer: COMMERCIAL

## 2024-06-21 NOTE — PROGRESS NOTES
AUDIOMETRIC EVALUATION       Name:  Rubio Huerta  :  1965  Age:  58 y.o.  Date of Evaluation:  2024     Ear Make and Model Serial Number /Tube size Dome Warranty Expiration   Right Phonak Audeo P90 R  [id number]  0 moderate Medium open 2024   Left Phonak Audeo P90 R  [id number]  0 moderate Medium open 2024       HISTORY  Rubio Huerta hearing aids repaired and dispensed today.    EVALUATION  Picked up loaners at , returned loaner devices.    RECOMMENDATIONS:  Continue medical follow-up with physician.  Return for audiologic assessment in conjunction with otologic care or annually.   Communication strategies were discussed (utilizing visual cues/gestures; reducing background noise and distance from desired source; increasing light to assist with visual cues; use of clear speech).  Continues use of binaural hearing aids during all waking moments.    PATIENT EDUCATION:   Discussed results and recommendations with Rubio Huerta.  Questions were addressed and the patient was encouraged to contact our department ([phone]) should concerns arise.    LOLITA Chinchilla, CCC-A  Senior Clinical Audiologist

## 2025-01-08 ENCOUNTER — OFFICE VISIT (OUTPATIENT)
Dept: ORTHOPEDIC SURGERY | Facility: HOSPITAL | Age: 60
End: 2025-01-08
Payer: COMMERCIAL

## 2025-01-08 ENCOUNTER — OFFICE VISIT (OUTPATIENT)
Facility: CLINIC | Age: 60
End: 2025-01-08
Payer: COMMERCIAL

## 2025-01-08 ENCOUNTER — HOSPITAL ENCOUNTER (OUTPATIENT)
Dept: RADIOLOGY | Facility: HOSPITAL | Age: 60
Discharge: HOME | End: 2025-01-08
Payer: COMMERCIAL

## 2025-01-08 ENCOUNTER — HOSPITAL ENCOUNTER (OUTPATIENT)
Dept: RADIOLOGY | Facility: EXTERNAL LOCATION | Age: 60
Discharge: HOME | End: 2025-01-08

## 2025-01-08 VITALS — HEART RATE: 101 BPM | WEIGHT: 250 LBS | OXYGEN SATURATION: 96 % | BODY MASS INDEX: 39.16 KG/M2

## 2025-01-08 DIAGNOSIS — M25.461 EFFUSION OF RIGHT KNEE: ICD-10-CM

## 2025-01-08 DIAGNOSIS — M70.41 PREPATELLAR BURSITIS OF RIGHT KNEE: Primary | ICD-10-CM

## 2025-01-08 DIAGNOSIS — M25.461 PAIN AND SWELLING OF KNEE, RIGHT: Primary | ICD-10-CM

## 2025-01-08 DIAGNOSIS — M25.561 PAIN AND SWELLING OF KNEE, RIGHT: Primary | ICD-10-CM

## 2025-01-08 DIAGNOSIS — M70.41 PREPATELLAR BURSITIS OF RIGHT KNEE: ICD-10-CM

## 2025-01-08 DIAGNOSIS — M17.11 PRIMARY OSTEOARTHRITIS OF RIGHT KNEE: ICD-10-CM

## 2025-01-08 PROCEDURE — 87205 SMEAR GRAM STAIN: CPT | Mod: AHULAB | Performed by: FAMILY MEDICINE

## 2025-01-08 PROCEDURE — 99214 OFFICE O/P EST MOD 30 MIN: CPT | Performed by: INTERNAL MEDICINE

## 2025-01-08 PROCEDURE — 99214 OFFICE O/P EST MOD 30 MIN: CPT | Mod: 25 | Performed by: FAMILY MEDICINE

## 2025-01-08 PROCEDURE — 99204 OFFICE O/P NEW MOD 45 MIN: CPT | Performed by: FAMILY MEDICINE

## 2025-01-08 PROCEDURE — 20611 DRAIN/INJ JOINT/BURSA W/US: CPT | Mod: RT | Performed by: FAMILY MEDICINE

## 2025-01-08 PROCEDURE — 73564 X-RAY EXAM KNEE 4 OR MORE: CPT | Mod: RT

## 2025-01-08 RX ORDER — DICLOXACILLIN SODIUM 250 MG/1
250 CAPSULE ORAL 4 TIMES DAILY
Qty: 56 CAPSULE | Refills: 0 | Status: SHIPPED | OUTPATIENT
Start: 2025-01-08 | End: 2025-01-22

## 2025-01-08 RX ORDER — FAMOTIDINE 20 MG/1
TABLET, FILM COATED ORAL
COMMUNITY
Start: 2017-09-22

## 2025-01-08 RX ORDER — ASPIRIN 81 MG/1
TABLET ORAL
COMMUNITY
Start: 2017-09-22

## 2025-01-08 RX ORDER — NAPROXEN 500 MG/1
500 TABLET ORAL
Qty: 60 TABLET | Refills: 1 | Status: SHIPPED | OUTPATIENT
Start: 2025-01-08 | End: 2025-03-09

## 2025-01-08 RX ORDER — CEPHALEXIN 500 MG/1
500 CAPSULE ORAL 3 TIMES DAILY
Qty: 30 CAPSULE | Refills: 0 | Status: SHIPPED | OUTPATIENT
Start: 2025-01-08 | End: 2025-01-18

## 2025-01-08 ASSESSMENT — PAIN SCALES - GENERAL
PAINLEVEL_OUTOF10: 5 - MODERATE PAIN
PAINLEVEL_OUTOF10: 5

## 2025-01-08 ASSESSMENT — PAIN DESCRIPTION - DESCRIPTORS: DESCRIPTORS: TIGHTNESS;THROBBING;PRESSURE

## 2025-01-08 ASSESSMENT — PAIN - FUNCTIONAL ASSESSMENT: PAIN_FUNCTIONAL_ASSESSMENT: 0-10

## 2025-01-08 NOTE — PROGRESS NOTES
Patient ID: Rubio Huerta is a 59 y.o. male.    L Inj/Asp: R patellar bursa on 1/8/2025 3:06 PM  Indications: pain  Details: 18 G needle, ultrasound-guided superolateral approach  Aspirate: 6 mL yellow and blood-tinged  Outcome: tolerated well, no immediate complications  Procedure, treatment alternatives, risks and benefits explained, specific risks discussed. Consent was given by the patient. Immediately prior to procedure a time out was called to verify the correct patient, procedure, equipment, support staff and site/side marked as required. Patient was prepped and draped in the usual sterile fashion.       Sports Medicine Office Note    Today's Date:  01/08/2025     HPI: Rubio Huerta is a 59 y.o. male who works for [employer] who presents today as a referral from his PCP, Eugenio Allan MD, for acute right knee pain and swelling.    Today, on 1/8/2025, he reports that 24-36 hours ago, he acutely developed pain and swelling over the right kneecap. The pain is exacerbated by any type of weightbearing. The swelling has worsened since last night. He has also developed redness and warmth overlying this area, which has persisted. He denies any fevers or other systemic symptoms. No recent illnesses. No recent trauma or injuries or falls onto the right knee. No recent known skin breakdown or abrasions or lacerations to the skin overlying the right knee. He has never had anything like this happen before. He tried taking Tylenol, which has not helped with the pain. Rubio does have a history of gout to the left great toe only and is not currently on any medication for gout. He saw his PCP Dr. Allan today, who referred him here for further evaluation and consideration of aspiration to determine if this could be a gout flare.    He has no other complaints.    Review of Systems  Constitutional: no fever, no chills, not feeling tired, no recent weight gain and no recent weight loss.   ENT: no nosebleeds.    Cardiovascular: no chest pain.   Respiratory: no shortness of breath and no cough.   Gastrointestinal: no abdominal pain, no nausea, no diarrhea and no vomiting.   Musculoskeletal: as noted in HPI and no arthralgias.   Integumentary: no rashes and no skin wound.   Neurological: no headache.   Psychiatric: no sleep disturbances and no depression.   Endocrine: no muscle weakness and no muscle cramps.   Hematologic/Lymphatic: no swollen glands and no tendency for easy bruising.    Physical Examination:     The Right knee is without obvious signs of acute bony deformity. There is moderate prepatellar swelling, erythema, and warmth to touch. There is minimal to no joint effusion. The patella is tender to palpation. Apprehension is negative with medial and lateral glide. Patella crepitus is negative. Patella grind is negative. The medial joint line is nontender and without bony crepitus or step-off. The lateral joint line is nontender and without bony crepitus or step-off. Flexion & extension are slightly limited secondary to pain and swelling. Varus & valgus stress test at 0° and 30° of flexion, Lachman's, and Anmol's are all negative. The opposite knee is nontender and stable. Gait is painful and tandem.    General: alert, active, in no acute distress  Psych: normal mood and affect   Vascular: no extremity edema  Head: atraumatic and normocephalic  Eyes: conjunctiva clear  Throat: moist mucous membranes  Neck: no lymphadenopathy  Lungs: breathing unlabored  Heart: 2+ peripheral pulses, warm and well perfused.  Abdomen: Deferred  Musculoskeletal: As documented above  Skin: warm, no rashes, no ecchymosis    Imaging:  Radiographs of the right knee obtained today (1/8/2025) were reviewed and revealed prepatellar soft tissue swelling and mild patellofemoral compartment osteoarthritis of the right knee.    The studies were reviewed by me personally in the office today.    Procedure:  After consent was obtained, the RIGHT  knee was prepped in a sterile fashion. Ultrasound guidance was used to help insure proper needle placement into the prepatellar bursa, decrease patient discomfort, and decrease collateral damage. The area was visualized and 6 mL of yonny colored fluid was aspirated without any complications. The area of swelling was also subsequently manually decompressed with manual application of pressure and milking the soft tissue surrounding the site of needle insertion, with several milliliters of serous drainage obtained.  There was an additional 4 to 8 mL of similarly colored, serous fluid expressed during compression.  Ultrasound images were saved on an internal file for later reference. The patient tolerated the procedure well and the area was cleaned and covered with gauze and bandaged with an ACE wrap/bandage.  Procedure performed Dr. Kerr  Problem List Items Addressed This Visit    None  Visit Diagnoses         Codes    Prepatellar bursitis of right knee    -  Primary M70.41    Relevant Medications    dicloxacillin (Dynapen) 250 mg capsule    naproxen (Naprosyn) 500 mg tablet    Other Relevant Orders    XR knee right 4+ views    Sterile Fluid Culture/Smear    Point of Care Ultrasound (Completed)    L Inj/Asp: R patellar bursa    Body Fluid Cell Count    Crystal Identification and Pathologist Review Synovial Fluid    Sterile Fluid Culture/Smear    Primary osteoarthritis of right knee     M17.11    Relevant Medications    dicloxacillin (Dynapen) 250 mg capsule    naproxen (Naprosyn) 500 mg tablet    Other Relevant Orders    Body Fluid Cell Count    Crystal Identification and Pathologist Review Synovial Fluid    Sterile Fluid Culture/Smear          Assessment and Plan:     We reviewed the exam and x-ray findings and discussed the conservative and surgical treatment options. We agreed to proceed with an ultrasound guided aspiration of the right prepatellar bursa, which he tolerated well. Aspirate was sent to the lab for  cell count, culture, and crystal identification. We discussed he should keep his ACE bandage on until tomorrow and then should wear it as much as he can for the following 3 days. We discussed that his history and exam are concerning for this being an infectious prepatellar bursitis. We provided a prescription for dicloxacillin 250 mg PO QID x 14 days, which he should begin taking. We also prescribed naproxen 500 mg PO BID. We will plan to follow-up after the labs have resulted to determine next steps.    **This note was dictated using Dragon speech recognition software and was not corrected for spelling or grammatical errors**.    Kurtis Borja MD, MEd  Primary Care Sports Medicine Fellow, PGY-4  [facility]

## 2025-01-08 NOTE — LETTER
January 9, 2025     Eugenio Allan MD  [address] [place], [facility] [address]  [facility] [address]    Patient: Rubio Huerta   YOB: 1965   Date of Visit: 1/8/2025       Dear Dr. Eugenio Allan MD:    Thank you for referring Rubio Huerta to me for evaluation. Below are my notes for this consultation.  If you have questions, please do not hesitate to call me. I look forward to following your patient along with you.       Sincerely,     Akhil Kerr MD      CC: No Recipients  ______________________________________________________________________________________    Patient ID: Rubio Huerta is a 59 y.o. male.    L Inj/Asp: R patellar bursa on 1/8/2025 3:06 PM  Indications: pain  Details: 18 G needle, ultrasound-guided superolateral approach  Aspirate: 6 mL yellow and blood-tinged  Outcome: tolerated well, no immediate complications  Procedure, treatment alternatives, risks and benefits explained, specific risks discussed. Consent was given by the patient. Immediately prior to procedure a time out was called to verify the correct patient, procedure, equipment, support staff and site/side marked as required. Patient was prepped and draped in the usual sterile fashion.       Sports Medicine Office Note    Today's Date:  01/08/2025     HPI: Rubio Huerta is a 59 y.o. male who works for [email or web address] who presents today as a referral from his PCP, Eugenio Allan MD, for acute right knee pain and swelling.    Today, on 1/8/2025, he reports that 24-36 hours ago, he acutely developed pain and swelling over the right kneecap. The pain is exacerbated by any type of weightbearing. The swelling has worsened since last night. He has also developed redness and warmth overlying this area, which has persisted. He denies any fevers or other systemic symptoms. No recent illnesses. No recent trauma or injuries or falls onto the right knee. No recent known skin breakdown or abrasions or lacerations to the  skin overlying the right knee. He has never had anything like this happen before. He tried taking Tylenol, which has not helped with the pain. Rubio does have a history of gout to the left great toe only and is not currently on any medication for gout. He saw his PCP Dr. Allan today, who referred him here for further evaluation and consideration of aspiration to determine if this could be a gout flare.    He has no other complaints.    Review of Systems  Constitutional: no fever, no chills, not feeling tired, no recent weight gain and no recent weight loss.   ENT: no nosebleeds.   Cardiovascular: no chest pain.   Respiratory: no shortness of breath and no cough.   Gastrointestinal: no abdominal pain, no nausea, no diarrhea and no vomiting.   Musculoskeletal: as noted in HPI and no arthralgias.   Integumentary: no rashes and no skin wound.   Neurological: no headache.   Psychiatric: no sleep disturbances and no depression.   Endocrine: no muscle weakness and no muscle cramps.   Hematologic/Lymphatic: no swollen glands and no tendency for easy bruising.    Physical Examination:     The Right knee is without obvious signs of acute bony deformity. There is moderate prepatellar swelling, erythema, and warmth to touch. There is minimal to no joint effusion. The patella is tender to palpation. Apprehension is negative with medial and lateral glide. Patella crepitus is negative. Patella grind is negative. The medial joint line is nontender and without bony crepitus or step-off. The lateral joint line is nontender and without bony crepitus or step-off. Flexion & extension are slightly limited secondary to pain and swelling. Varus & valgus stress test at 0° and 30° of flexion, Lachman's, and Anmol's are all negative. The opposite knee is nontender and stable. Gait is painful and tandem.    General: alert, active, in no acute distress  Psych: normal mood and affect   Vascular: no extremity edema  Head: atraumatic and  normocephalic  Eyes: conjunctiva clear  Throat: moist mucous membranes  Neck: no lymphadenopathy  Lungs: breathing unlabored  Heart: 2+ peripheral pulses, warm and well perfused.  Abdomen: Deferred  Musculoskeletal: As documented above  Skin: warm, no rashes, no ecchymosis    Imaging:  Radiographs of the right knee obtained today (1/8/2025) were reviewed and revealed prepatellar soft tissue swelling and mild patellofemoral compartment osteoarthritis of the right knee.    The studies were reviewed by me personally in the office today.    Procedure:  After consent was obtained, the RIGHT knee was prepped in a sterile fashion. Ultrasound guidance was used to help insure proper needle placement into the prepatellar bursa, decrease patient discomfort, and decrease collateral damage. The area was visualized and 6 mL of yonny colored fluid was aspirated without any complications. The area of swelling was also subsequently manually decompressed with manual application of pressure and milking the soft tissue surrounding the site of needle insertion, with several milliliters of serous drainage obtained.  There was an additional 4 to 8 mL of similarly colored, serous fluid expressed during compression.  Ultrasound images were saved on an internal file for later reference. The patient tolerated the procedure well and the area was cleaned and covered with gauze and bandaged with an ACE wrap/bandage.  Procedure performed Dr. Kerr  Problem List Items Addressed This Visit    None  Visit Diagnoses         Codes    Prepatellar bursitis of right knee    -  Primary M70.41    Relevant Medications    dicloxacillin (Dynapen) 250 mg capsule    naproxen (Naprosyn) 500 mg tablet    Other Relevant Orders    XR knee right 4+ views    Sterile Fluid Culture/Smear    Point of Care Ultrasound (Completed)    L Inj/Asp: R patellar bursa    Body Fluid Cell Count    Crystal Identification and Pathologist Review Synovial Fluid    Sterile Fluid  Culture/Smear    Primary osteoarthritis of right knee     M17.11    Relevant Medications    dicloxacillin (Dynapen) 250 mg capsule    naproxen (Naprosyn) 500 mg tablet    Other Relevant Orders    Body Fluid Cell Count    Crystal Identification and Pathologist Review Synovial Fluid    Sterile Fluid Culture/Smear          Assessment and Plan:     We reviewed the exam and x-ray findings and discussed the conservative and surgical treatment options. We agreed to proceed with an ultrasound guided aspiration of the right prepatellar bursa, which he tolerated well. Aspirate was sent to the lab for cell count, culture, and crystal identification. We discussed he should keep his ACE bandage on until tomorrow and then should wear it as much as he can for the following 3 days. We discussed that his history and exam are concerning for this being an infectious prepatellar bursitis. We provided a prescription for dicloxacillin 250 mg PO QID x 14 days, which he should begin taking. We also prescribed naproxen 500 mg PO BID. We will plan to follow-up after the labs have resulted to determine next steps.    **This note was dictated using Dragon speech recognition software and was not corrected for spelling or grammatical errors**.    Kurtis Borja MD, MEd  Primary Care Sports Medicine Fellow, PGY-4  [facility]    Attestation with edits by Akhil Kerr MD at 1/9/2025 12:33 PM:    Attending Note     Trainee role: Fellow    Trainee discussed patient with Dr. Kerr          I saw and evaluated the patient. I personally obtained the key and critical portions of the history and physical exam or was physically present for key and critical portions performed by the trainee. I reviewed the trainee's documentation and discussed the patient with the trainee. I agree with the trainee's medical decision making, as documented on the trainee's note.     **He presents today with acute prepatellar bursitis and denies trauma, repetitive  kneeling activities, or any signs of infection or puncture wounds.  Due to its presentation, there is concern for septic bursitis and aspiration was performed.  Fluid did not look infectious but sent to the lab for appropriate testing.  He was started on empiric antibiotics.  Compression was recommended.  He will follow-up with me or his PCP depending on lab results.    Akhil Kerr MD  Sports Medicine Specialist  [facility] [place]

## 2025-01-11 LAB
BACTERIA FLD CULT: NORMAL
GRAM STN SPEC: NORMAL
GRAM STN SPEC: NORMAL

## 2025-01-22 DIAGNOSIS — M79.89 SWELLING OF EXTREMITY, LEFT: ICD-10-CM

## 2025-01-22 DIAGNOSIS — M10.9 GOUT, UNSPECIFIED CAUSE, UNSPECIFIED CHRONICITY, UNSPECIFIED SITE: ICD-10-CM

## 2025-01-22 RX ORDER — METHYLPREDNISOLONE 4 MG/1
TABLET ORAL
Qty: 21 TABLET | Refills: 0 | Status: SHIPPED | OUTPATIENT
Start: 2025-01-22

## 2025-02-04 NOTE — PROGRESS NOTES
Subjective   Patient ID: Rubio Huerta is a 59 y.o. male who presents for Knee Pain (Right knee).    R knee swelling & pain x 1 day    HPI     No trauma     Increased with weight bearing        Review of Systems      Constitutional: Negative for activity change, appetite change, chills, fatigue and fever.   HENT: Negative for congestion, ear pain, rhinorrhea, sinus pain and sore throat.   Eyes: Negative for pain, discharge and redness.   Respiratory: Negative for cough, shortness of breath and wheezing.   Cardiovascular: Negative for chest pain.   Gastrointestinal: Negative for abdominal pain.   Skin: Negative for rash.      Objective   Pulse 101   Wt 113 kg (250 lb)   SpO2 96%   BMI 39.16 kg/m²     Physical Exam      General: WDWN in NAD  HEENT : Mucous membranes are moist. PERRL. Sclera are without icterus.No adenopathy. JVP is not elevated.  Lungs : CTA bilateral. Breath sounds are equal bilaterally  Heart: regular rate & rhythm. No murmurs or extra heart sounds. No rub  Abdomen: soft, non-tender, no masses or organomegaly. Bowel sounds are present.  Extremities: No clubbing, cyanosis, edema  Neuro: A&O x3 . Normal Affect. Gait is normal.  Rheum: R knee swelling, increased heat and erythema  Pain with ambulation    Assessment/Plan        R knee swelling x 1 day    DDX:    Bursitis    Gouty arthritis    Septic arthritis    Plan:    Refer to orthopedics for  x-rays and  further evaluation    ? Knee aspiration     NSAIDS PRN    Call/My chart/ follow up if symptoms persist/worsen

## 2025-02-07 ENCOUNTER — TELEPHONE (OUTPATIENT)
Facility: CLINIC | Age: 60
End: 2025-02-07
Payer: COMMERCIAL

## 2025-02-07 NOTE — TELEPHONE ENCOUNTER
Molly, I woke up this morning much much better. Not 100% but better. I will put knee compression on it and hopefully will not need to come. I appreciate your time.      Have a good weekend.     Rubio    Sent from my iPhone      On Feb 6, 2025, at 9:52?PM, Ganesh Huerta <[email or web address]> wrote:  ? 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 On my feet the majority of the time.      My right knee has flared up. Like last time , it is very swollen and sore. Looks like fluid build up again.    I am flying home tomorrow morning / Friday.  I would be available after 10:30am.     Is it possible to see Dr. Allan or Dr Kerr?    Please let me know.     [phone]

## 2025-02-10 ENCOUNTER — TELEPHONE (OUTPATIENT)
Dept: PRIMARY CARE | Facility: CLINIC | Age: 60
End: 2025-02-10
Payer: COMMERCIAL

## 2025-02-10 NOTE — TELEPHONE ENCOUNTER
NIMAOVM regarding his message regarding R knee swelling/bursitis. Follow up with orthopedics if symptoms persist/worsen.

## 2025-02-24 DIAGNOSIS — E78.5 ELEVATED LIPIDS: ICD-10-CM

## 2025-02-24 RX ORDER — ATORVASTATIN CALCIUM 10 MG/1
10 TABLET, FILM COATED ORAL DAILY
Qty: 90 TABLET | Refills: 3 | Status: SHIPPED | OUTPATIENT
Start: 2025-02-24 | End: 2026-02-24

## 2025-03-25 ENCOUNTER — APPOINTMENT (OUTPATIENT)
Dept: INTEGRATIVE MEDICINE | Facility: CLINIC | Age: 60
End: 2025-03-25
Payer: COMMERCIAL

## 2025-03-25 ENCOUNTER — HOSPITAL ENCOUNTER (OUTPATIENT)
Dept: VASCULAR MEDICINE | Facility: HOSPITAL | Age: 60
Discharge: HOME | End: 2025-03-25
Payer: COMMERCIAL

## 2025-03-25 ENCOUNTER — APPOINTMENT (OUTPATIENT)
Dept: PRIMARY CARE | Facility: CLINIC | Age: 60
End: 2025-03-25

## 2025-03-25 ENCOUNTER — HOSPITAL ENCOUNTER (OUTPATIENT)
Dept: RADIOLOGY | Facility: HOSPITAL | Age: 60
Discharge: HOME | End: 2025-03-25
Payer: COMMERCIAL

## 2025-03-25 ENCOUNTER — NUTRITION (OUTPATIENT)
Dept: PRIMARY CARE | Facility: CLINIC | Age: 60
End: 2025-03-25

## 2025-03-25 ENCOUNTER — HOSPITAL ENCOUNTER (OUTPATIENT)
Dept: CARDIOLOGY | Facility: HOSPITAL | Age: 60
Discharge: HOME | End: 2025-03-25
Payer: COMMERCIAL

## 2025-03-25 VITALS
OXYGEN SATURATION: 96 % | WEIGHT: 249 LBS | HEART RATE: 75 BPM | HEIGHT: 67 IN | SYSTOLIC BLOOD PRESSURE: 132 MMHG | BODY MASS INDEX: 39.08 KG/M2 | DIASTOLIC BLOOD PRESSURE: 78 MMHG

## 2025-03-25 DIAGNOSIS — R06.83 SNORING: ICD-10-CM

## 2025-03-25 DIAGNOSIS — Z00.00 HEALTH MAINTENANCE EXAMINATION: ICD-10-CM

## 2025-03-25 DIAGNOSIS — R09.89 OTHER SPECIFIED SYMPTOMS AND SIGNS INVOLVING THE CIRCULATORY AND RESPIRATORY SYSTEMS: ICD-10-CM

## 2025-03-25 DIAGNOSIS — Z12.11 COLON CANCER SCREENING: ICD-10-CM

## 2025-03-25 DIAGNOSIS — Z00.00 HEALTH MAINTENANCE EXAMINATION: Primary | ICD-10-CM

## 2025-03-25 DIAGNOSIS — Z13.6 ENCOUNTER FOR SCREENING FOR CARDIOVASCULAR DISORDERS: ICD-10-CM

## 2025-03-25 DIAGNOSIS — E66.9 OBESITY (BMI 30-39.9): ICD-10-CM

## 2025-03-25 DIAGNOSIS — E78.5 HYPERLIPIDEMIA, UNSPECIFIED HYPERLIPIDEMIA TYPE: ICD-10-CM

## 2025-03-25 DIAGNOSIS — D16.9 OSTEOMA: ICD-10-CM

## 2025-03-25 LAB
25(OH)D3 SERPL-MCNC: 63 NG/ML (ref 30–100)
ALBUMIN SERPL BCP-MCNC: 4.2 G/DL (ref 3.4–5)
ALP SERPL-CCNC: 53 U/L (ref 33–136)
ALT SERPL W P-5'-P-CCNC: 37 U/L (ref 10–52)
ANION GAP SERPL CALC-SCNC: 11 MMOL/L (ref 10–20)
AST SERPL W P-5'-P-CCNC: 31 U/L (ref 9–39)
BASOPHILS # BLD AUTO: 0.04 X10*3/UL (ref 0–0.1)
BASOPHILS NFR BLD AUTO: 0.6 %
BILIRUB SERPL-MCNC: 0.5 MG/DL (ref 0–1.2)
BUN SERPL-MCNC: 18 MG/DL (ref 6–23)
CALCIUM SERPL-MCNC: 8.8 MG/DL (ref 8.6–10.3)
CHLORIDE SERPL-SCNC: 107 MMOL/L (ref 98–107)
CHOLEST SERPL-MCNC: 153 MG/DL (ref 0–199)
CHOLESTEROL/HDL RATIO: 5.5
CO2 SERPL-SCNC: 26 MMOL/L (ref 21–32)
CREAT SERPL-MCNC: 0.99 MG/DL (ref 0.5–1.3)
CRP SERPL HS-MCNC: 5.9 MG/L
EGFRCR SERPLBLD CKD-EPI 2021: 87 ML/MIN/1.73M*2
EOSINOPHIL # BLD AUTO: 0.09 X10*3/UL (ref 0–0.7)
EOSINOPHIL NFR BLD AUTO: 1.4 %
ERYTHROCYTE [DISTWIDTH] IN BLOOD BY AUTOMATED COUNT: 13.7 % (ref 11.5–14.5)
EST. AVERAGE GLUCOSE BLD GHB EST-MCNC: 111 MG/DL
FERRITIN SERPL-MCNC: 405 NG/ML (ref 20–300)
GLUCOSE SERPL-MCNC: 97 MG/DL (ref 74–99)
HBA1C MFR BLD: 5.5 %
HCT VFR BLD AUTO: 44.8 % (ref 41–52)
HDLC SERPL-MCNC: 27.8 MG/DL
HGB BLD-MCNC: 15.3 G/DL (ref 13.5–17.5)
IMM GRANULOCYTES # BLD AUTO: 0.04 X10*3/UL (ref 0–0.7)
IMM GRANULOCYTES NFR BLD AUTO: 0.6 % (ref 0–0.9)
INSULIN P FAST SERPL-ACNC: 36 UIU/ML (ref 3–25)
IRON SATN MFR SERPL: 27 % (ref 25–45)
IRON SERPL-MCNC: 83 UG/DL (ref 35–150)
LDLC SERPL CALC-MCNC: 96 MG/DL
LYMPHOCYTES # BLD AUTO: 1.54 X10*3/UL (ref 1.2–4.8)
LYMPHOCYTES NFR BLD AUTO: 24.8 %
MAGNESIUM SERPL-MCNC: 2.32 MG/DL (ref 1.6–2.4)
MCH RBC QN AUTO: 30.4 PG (ref 26–34)
MCHC RBC AUTO-ENTMCNC: 34.2 G/DL (ref 32–36)
MCV RBC AUTO: 89 FL (ref 80–100)
MONOCYTES # BLD AUTO: 0.42 X10*3/UL (ref 0.1–1)
MONOCYTES NFR BLD AUTO: 6.8 %
NEUTROPHILS # BLD AUTO: 4.09 X10*3/UL (ref 1.2–7.7)
NEUTROPHILS NFR BLD AUTO: 65.8 %
NON HDL CHOLESTEROL: 125 MG/DL (ref 0–149)
NRBC BLD-RTO: 0 /100 WBCS (ref 0–0)
PLATELET # BLD AUTO: 214 X10*3/UL (ref 150–450)
POC APPEARANCE, URINE: CLEAR
POC BILIRUBIN, URINE: NEGATIVE
POC BLOOD, URINE: NEGATIVE
POC COLOR, URINE: YELLOW
POC GLUCOSE, URINE: NEGATIVE MG/DL
POC KETONES, URINE: NEGATIVE MG/DL
POC LEUKOCYTES, URINE: NEGATIVE
POC NITRITE,URINE: NEGATIVE
POC PH, URINE: 6.5 PH
POC PROTEIN, URINE: NEGATIVE MG/DL
POC SPECIFIC GRAVITY, URINE: 1.01
POC UROBILINOGEN, URINE: 0.2 EU/DL
POTASSIUM SERPL-SCNC: 4.4 MMOL/L (ref 3.5–5.3)
PROT SERPL-MCNC: 6.6 G/DL (ref 6.4–8.2)
PSA SERPL-MCNC: 3.4 NG/ML
RBC # BLD AUTO: 5.04 X10*6/UL (ref 4.5–5.9)
SODIUM SERPL-SCNC: 140 MMOL/L (ref 136–145)
TIBC SERPL-MCNC: 307 UG/DL (ref 240–445)
TRIGL SERPL-MCNC: 145 MG/DL (ref 0–149)
TSH SERPL-ACNC: 2.13 MIU/L (ref 0.44–3.98)
UIBC SERPL-MCNC: 224 UG/DL (ref 110–370)
URATE SERPL-MCNC: 6.8 MG/DL (ref 4–7.5)
VIT B12 SERPL-MCNC: 693 PG/ML (ref 211–911)
VLDL: 29 MG/DL (ref 0–40)
WBC # BLD AUTO: 6.2 X10*3/UL (ref 4.4–11.3)

## 2025-03-25 PROCEDURE — 83036 HEMOGLOBIN GLYCOSYLATED A1C: CPT

## 2025-03-25 PROCEDURE — 81002 URINALYSIS NONAUTO W/O SCOPE: CPT | Performed by: INTERNAL MEDICINE

## 2025-03-25 PROCEDURE — EXAM4 EXAM 4: Performed by: INTERNAL MEDICINE

## 2025-03-25 PROCEDURE — 83550 IRON BINDING TEST: CPT

## 2025-03-25 PROCEDURE — 83525 ASSAY OF INSULIN: CPT

## 2025-03-25 PROCEDURE — 82172 ASSAY OF APOLIPOPROTEIN: CPT

## 2025-03-25 PROCEDURE — 83735 ASSAY OF MAGNESIUM: CPT

## 2025-03-25 PROCEDURE — 93880 EXTRACRANIAL BILAT STUDY: CPT

## 2025-03-25 PROCEDURE — 82607 VITAMIN B-12: CPT

## 2025-03-25 PROCEDURE — 93017 CV STRESS TEST TRACING ONLY: CPT

## 2025-03-25 PROCEDURE — 71046 X-RAY EXAM CHEST 2 VIEWS: CPT

## 2025-03-25 PROCEDURE — 84443 ASSAY THYROID STIM HORMONE: CPT

## 2025-03-25 PROCEDURE — 3008F BODY MASS INDEX DOCD: CPT | Performed by: INTERNAL MEDICINE

## 2025-03-25 PROCEDURE — 85025 COMPLETE CBC W/AUTO DIFF WBC: CPT

## 2025-03-25 PROCEDURE — 93978 VASCULAR STUDY: CPT

## 2025-03-25 PROCEDURE — 93880 EXTRACRANIAL BILAT STUDY: CPT | Performed by: INTERNAL MEDICINE

## 2025-03-25 PROCEDURE — NUTCO NUTRITION CONSULTATION: Performed by: DIETITIAN, REGISTERED

## 2025-03-25 PROCEDURE — 84550 ASSAY OF BLOOD/URIC ACID: CPT

## 2025-03-25 PROCEDURE — 84402 ASSAY OF FREE TESTOSTERONE: CPT

## 2025-03-25 PROCEDURE — 93016 CV STRESS TEST SUPVJ ONLY: CPT | Performed by: INTERNAL MEDICINE

## 2025-03-25 PROCEDURE — 82728 ASSAY OF FERRITIN: CPT

## 2025-03-25 PROCEDURE — 86141 C-REACTIVE PROTEIN HS: CPT

## 2025-03-25 PROCEDURE — 83540 ASSAY OF IRON: CPT

## 2025-03-25 PROCEDURE — 84153 ASSAY OF PSA TOTAL: CPT

## 2025-03-25 PROCEDURE — 80061 LIPID PANEL: CPT

## 2025-03-25 PROCEDURE — 93978 VASCULAR STUDY: CPT | Performed by: INTERNAL MEDICINE

## 2025-03-25 PROCEDURE — 80053 COMPREHEN METABOLIC PANEL: CPT

## 2025-03-25 PROCEDURE — 93018 CV STRESS TEST I&R ONLY: CPT | Performed by: INTERNAL MEDICINE

## 2025-03-25 PROCEDURE — SMAR2 SMART-UHCSM: Performed by: CHIROPRACTOR

## 2025-03-25 PROCEDURE — 82306 VITAMIN D 25 HYDROXY: CPT

## 2025-03-25 RX ORDER — ACETAMINOPHEN 500 MG
5000 TABLET ORAL DAILY
COMMUNITY

## 2025-03-25 ASSESSMENT — ENCOUNTER SYMPTOMS
BRUISES/BLEEDS EASILY: 0
SHORTNESS OF BREATH: 0
FREQUENCY: 0
DIFFICULTY URINATING: 0
NUMBNESS: 0
DYSURIA: 0
CONFUSION: 0
FATIGUE: 0
BACK PAIN: 0
NEUROLOGICAL NEGATIVE: 1
GASTROINTESTINAL NEGATIVE: 1
JOINT SWELLING: 1
HEMATURIA: 0
EYE PAIN: 0
WHEEZING: 0
HEADACHES: 0
DIZZINESS: 0
NERVOUS/ANXIOUS: 0
ABDOMINAL PAIN: 0
TREMORS: 0
AGITATION: 0
FLANK PAIN: 0
POLYDIPSIA: 0
HEMATOLOGIC/LYMPHATIC NEGATIVE: 1
BLOOD IN STOOL: 0
ADENOPATHY: 0
CHEST TIGHTNESS: 0
SLEEP DISTURBANCE: 0
EYE REDNESS: 0
DIARRHEA: 0
NAUSEA: 0
SINUS PRESSURE: 0
FEVER: 0
APPETITE CHANGE: 0
PALPITATIONS: 0
ARTHRALGIAS: 0
VOMITING: 0
COUGH: 0
MYALGIAS: 0
APNEA: 0
ALLERGIC/IMMUNOLOGIC NEGATIVE: 1
CONSTIPATION: 0
SORE THROAT: 0

## 2025-03-25 NOTE — PROGRESS NOTES
Executive Physical         Patient ID: Rubio Huerta is a 59 y.o. male who presents for Executive Evaluation:    The following report is in reference to your  executive physical examination which was held at [facility] on 03/25/25. Firstly, let me state that it was a pleasure meeting with you and that we appreciate you coming to [facility] for your executive evaluation.    At the time of your evaluation you were feeling well with no significant health concerns.    You were not complaining of fever ,chills, headache ,dizziness ,cough, chest pain shortness of breath, palpitations, nausea, vomiting, abdominal pain, loss of appetite, diarrhea, blood in the stools, frequent urination or painful urination.    Past Medical History:   Diagnosis Date    DVT (deep venous thrombosis) (Multi)     Other abnormal glucose     Elevated glucose    Personal history of other diseases of the musculoskeletal system and connective tissue     History of gout    Personal history of other drug therapy     History of influenza vaccination    Personal history of other infectious and parasitic diseases     History of herpes zoster         Review of Systems   Constitutional:  Negative for appetite change, fatigue and fever.   HENT:  Negative for congestion, ear discharge, ear pain, hearing loss, mouth sores, postnasal drip, sinus pressure, sore throat and tinnitus.         Snoring   Eyes:  Negative for pain and redness.   Respiratory:  Negative for apnea, cough, chest tightness, shortness of breath and wheezing.    Cardiovascular:  Negative for chest pain, palpitations and leg swelling.   Gastrointestinal: Negative.  Negative for abdominal pain, blood in stool, constipation, diarrhea, nausea and vomiting.   Endocrine: Negative for polydipsia and polyuria.   Genitourinary:  Negative for decreased urine volume, difficulty urinating, dysuria, enuresis, flank pain, frequency,  "hematuria, penile discharge, penile pain, scrotal swelling, testicular pain and urgency.        Nocturia x 2   Musculoskeletal:  Positive for joint swelling. Negative for arthralgias, back pain, gait problem and myalgias.        Knee swelling has improved.   Skin:  Negative for pallor and rash.   Allergic/Immunologic: Negative.    Neurological: Negative.  Negative for dizziness, tremors, syncope, numbness and headaches.   Hematological: Negative.  Negative for adenopathy. Does not bruise/bleed easily.   Psychiatric/Behavioral:  Negative for agitation, confusion and sleep disturbance. The patient is not nervous/anxious.    All other systems reviewed and are negative.          Patient Active Problem List   Diagnosis    Ankle pain    Arthritis of foot    Bony spur    Congested nose    Elevated lipids    Gout    Hearing loss    Hearing loss, sensorineural, asymmetrical    Heartburn    Low serum testosterone    Osteoarthritis of subtalar joint    Parotiditis    PTTD (posterior tibial tendon dysfunction)    Shingles rash    Snoring    Toe swelling        Past Surgical History:   Procedure Laterality Date    COLONOSCOPY  09/22/2017    Complete Colonoscopy        Family History   Problem Relation Name Age of Onset    Heart disease Father          No Known Allergies       Current Outpatient Medications:     apixaban (Eliquis) 5 mg tablet, Take 1 tablet (5 mg) by mouth 2 times a day., Disp: 180 tablet, Rfl: 0    aspirin 81 mg EC tablet, Take by mouth once daily., Disp: , Rfl:     atorvastatin (Lipitor) 10 mg tablet, Take 1 tablet (10 mg) by mouth once daily., Disp: 90 tablet, Rfl: 3    cholecalciferol (Vitamin D3) 5,000 Units tablet, Take 1 tablet (5,000 Units) by mouth once daily., Disp: , Rfl:     famotidine (Pepcid) 20 mg tablet, Take by mouth once daily., Disp: , Rfl:      Visit Vitals  /78   Pulse 75   Ht 1.689 m (5' 6.5\")   Wt 113 kg (249 lb)   SpO2 96%   BMI 39.59 kg/m²   Smoking Status Former   BSA 2.3 m²    "     Physical Exam  Vitals reviewed.   Constitutional:       Appearance: Normal appearance.   HENT:      Head: Normocephalic and atraumatic.      Right Ear: Tympanic membrane and ear canal normal.      Left Ear: Tympanic membrane and ear canal normal.      Nose: Nose normal.      Mouth/Throat:      Mouth: Mucous membranes are moist.   Eyes:      Extraocular Movements: Extraocular movements intact.      Conjunctiva/sclera: Conjunctivae normal.      Pupils: Pupils are equal, round, and reactive to light.   Cardiovascular:      Rate and Rhythm: Normal rate and regular rhythm.      Pulses: Normal pulses.      Heart sounds: Normal heart sounds. No murmur heard.     No friction rub. No gallop.   Pulmonary:      Effort: Pulmonary effort is normal. No respiratory distress.      Breath sounds: Normal breath sounds. No wheezing or rales.   Abdominal:      General: Abdomen is flat. Bowel sounds are normal. There is no distension.      Palpations: Abdomen is soft. There is no mass.      Tenderness: There is no abdominal tenderness. There is no guarding or rebound.      Hernia: No hernia is present.   Genitourinary:     Penis: Normal.       Testes: Normal.   Musculoskeletal:         General: No swelling, tenderness or deformity. Normal range of motion.      Cervical back: Normal range of motion.   Skin:     General: Skin is warm.      Findings: No bruising, lesion or rash.   Neurological:      General: No focal deficit present.      Mental Status: He is alert and oriented to person, place, and time.      Sensory: No sensory deficit.      Motor: No weakness.      Coordination: Coordination normal.   Psychiatric:         Mood and Affect: Mood normal.         Behavior: Behavior normal.     Ancillary studies:    Vision screening- Deferred    Bone density - Normal     Audiogram - Hearing Aids    Discussion/Summary  In summary you are 59 y.o. male with a past medical history of DVT,  Hyperlipidemia and stage 2 Obesity .  At the time of  your evaluation you were feeling well with no significant health concerns.    Physical examination including blood pressure  and cardiovascular exam was unremarkable. Elevated body mass index/ % body fat.    Lab studies including complete blood count, comprehensive metabolic panel,  lipid panel, thyroid function, PSA , Vitamin D, Vitamin B12, magnesium, iron, uric acid ,insulin, Lp(a) Testosterone and HBA1C were normal.    Addendum ( 3/27/25): CXR with  Prominence of both juanita with likely right perihilar  infiltrate/peribronchial thickening.    Cardiology- Normal EKG, CT-Cardiac score ( 2022), Exercise stress test, Carotid and Abdominal Aorta ultrasound studies.     Stage 2 Obesity-  Increase protein/fish/fiber intake and limit processed/refined carbohydrates and added sugars. Increased physical activity to a minimum of 150 minutes of moderate exercise per week. Follow recommendations per dietician consult. Recommend switching from Semaglutide from Tirzepatide Rx for additional health benefits.    Hyperlipidemia- Limit animal fats( red meat, cheese, shell fish,  egg yolks, full fat dairy/yoghurt and processed meats).  Instead, replace some of the saturated fats in your diet with healthier unsaturated fats, which are found in fish, nuts, avocados and vegetable oils, such as olive oil, canola oil and safflower oil. Continue with Atorvastatin 10 mg daily.    Snoring- recommend apnea test to confirm/exclude obstructive sleep apnea.    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    Elevated HS-CRP levels- This can be caused by a variety of conditions and factors, including infections,inflammation, cardiovascular diseases,  metabolic syndrome and obesity, lifestyle factors ( smoking, physical inactivity) and chronic conditions. Repeat lab  in 4-6 weeks.    Cancer screening- you are current  with prostate and lung cancer screening tests. Recommend  screening colonoscopy at your earliest convenience.    Skin - Please follow up with dermatology( Dr Peterson )  for annual examination. Monitor for any skin lesions( ugly duckling sign)  that are different in color, shape, or size than others on body. Recommend SPF 30+, hats with brims, sun protective clothing, and avoiding sun exposure between 10 AM and 2 PM whenever possible. Recommend a daily skin moisturizer such as Aveeno or Lac-Hydrin.    Vaccine- I would  recommend a shingles (Shingrix) vaccine at your earliest convenience.  In addition I would recommend a tetanus booster every 10 years to maintain immunity. Consider a   Pneumonia vaccine (Prevnar 20) at age 50 onwards per CDC recommendations. Recommend RSV at  age 60 onwards. Consider COVID-19 booster and flu shot this winter.    Wellness: Please continue with a balanced diet and a regular physical activity program for at least 150 minutes/week of moderate exercise and 30 minutes/week of resistance/weight training per week.  Try to get 6 to 8 hours of sleep per night.  Please download the Calm,  Headspace or Unwinding Anxiety  mai from the [employer] to assist with stress and sleep management if necessary. Avoid driving distractions and remember : STAY ALIVE. DONT TEXT AND DRIVE !     In conclusion,  I wish to thank you for attending the  [facility]  I wish you and your family a safe and healthy Spring Season    Please email me at [email or web address] or call me at [phone] if you have any questions pertaining to this report or any other medical concerns.    Be Well    Dr ESPERANZA Malone and Jill Quintero Master Clinician in Wellness    Senior Attending Physician , [facility] [place]    [facility]    Clinical     [facility] [employer]    [place], [place]    This note was partially generated using the Dragon voice recognition system.  There  may be some incorrect wording ,grammar, spelling or punctuation errors that were not corrected prior to committing the note to the medical record.     Addendum-3/27/25-CXR with  Prominence of both juanita with likely right perihilar  infiltrate/peribronchial thickening.  Telephone call today regarding abnormal CXR report-patient denies any cough, chest pain, wheezing, shortness of breath, fever.  He is going out of town until April 16 and we will schedule the CCT when he returns.  He will call me if any symptoms occur in the interim.

## 2025-03-26 VITALS — HEIGHT: 67 IN | WEIGHT: 249.12 LBS | BODY MASS INDEX: 39.1 KG/M2

## 2025-03-26 NOTE — PROGRESS NOTES
It was a pleasure meeting Mr. Rubio Huerta for a follow up nutrition assessment at [facility] to discuss diet and nutrition as part of his Executive Health Physical.     Nutrition Assessment    Patient Active Problem List   Diagnosis    Ankle pain    Arthritis of foot    Bony spur    Congested nose    Elevated lipids    Gout    Hearing loss    Hearing loss, sensorineural, asymmetrical    Heartburn    Low serum testosterone    Osteoarthritis of subtalar joint    Parotiditis    PTTD (posterior tibial tendon dysfunction)    Shingles rash    Snoring    Toe swelling           Nutrition History:  Food & Nutrition History: Mr. Huerta's weight has been stable over the past year but he had lost 10# since 2023 when he started Ozempic which seems to no longer be effective at promoting weight loss.  He and MD have spoken about changing to Tirzepatide,. He feels that his portions are smaller and that he eats less than he had in the past.  At work he likes a half of a tuna salad on rye with cottage cheese or hard boiled egg..  At dinner he tends to to eat more red meat. He seldom has snacks but when traveling he brings Chomps meat stick.  He has a 3-bite rule for having desserts.  He limits caffeine to morning hours -1 c/day.  He states that shellfish may trigger gout.  He drinks bourbon vodka and wine (6 total drinks per week) and very occasionally beer which does not trigger gout.    He  travels nationally and internationally and was recently in [place] for 2 weeks. He goes through many time zones and eats most meals are in restaurants where he is aware that he has less control over ingredients, preparation and choices of foods and where he is more likelly to select higher saturated fat/caloric foods and drink alcohol.  Rubio exercises regularly, walking every day, doing a 40 minute Pelaton bike workout weekly, doing a 20-30 minute elliptical and/or weight lifting workout 2-3 times weekly.  In the past, Rubio did  "work with [employer], the Dietitian contracted by [facility] who offers a Functional Nutrition care plan.  He was put on several nutritional supplements which Rubio has continued to take and has also added a few additional supplements.    Food Allergies: None;  Food Intolerances: None  Vitamin/mineral intake: 5,000 international Units Vitamin D, Niacin   Herbal supplements: None  Medication and Complementary/Alternative Medicine Use: Fish oil 5000 mg; ; Adreset (adrenal reset); cinnamon celery root, cortosol manager; Liver-GI detox apple cider vinegar (pills) melatonin; fruit fiber supplement powder mixed with water ; daily  GI Symptoms: GI Symptoms : reflux, occasionally (takes Pepcid)  Sleep Habits: 5-6 hours disrupted    Diet Recall:  DAY 1:       Meal 1:  8 am   8 oz OWYN Protein Drink       Meal 2: 12 pm  8 oz tomato soup with turkey and egg (work cafeteria); 2 crackers       Meal 3:   6 pm   8 oz baked cod; 1/4 cup potatoes; 1/4 cup green beans       Snacks:       Beverages: 1 cup coffee + MCT oil + collagen; 2 cans soda water. 8 oz Water (He limits caffeine to 1 c/day)    DAY 2:       Meal 1: 11 am   Starbucks breakfast sandwich:Egg white, turkey ruiz, cheddar cheese, English Muffin       Meal 2:   7 pm   8 oz meatballs;4 oz beaded veal pasta; with red sauce and cheese and crumbled Italian sausage        Meal 3:       Snacks:       Beverages:  8 oz water, 24-36 oz soda water, 1 c coffee with MCT oil + collagen    DAY 3:       Meal 1:  11 am   [place]:  roasted pork, deli ham, swiss cheese, pickles, mustard on a Mosotho roll       Meal 2:    7 pm  6 oz Prime rib; 4 peppers, 1 large shrimp       Meal 3:       Snacks:       Beverages  8 oz water, 24-2-36 oz soda water, 1 c coffee with  MCT oil + collagen    Oral Nutrition Supplement Use:  OWYN Protein drink  daily        Food Preparation:    Dining Out: more than 3 times a week      Anthropometrics:  Height: 168.9 cm (5' 6.5\")   Weight: 113 kg " (249 lb 1.9 oz)   BMI (Calculated): 39.61            Adjusted Body Weight (kg): 84.5 kg    Weight History:   Daily Weight  03/26/25 : 113 kg (249 lb 1.9 oz)  03/25/25 : 113 kg (249 lb)  01/08/25 : 113 kg (250 lb)  12/20/23 : 118 kg (260 lb)  10/05/23 : 117 kg (258 lb 12.8 oz)    Weight Change %:  Weight History / % Weight Change: 2023 patient's weight was 250; 2024 weight 245; today, 2025 weight 249  Significant Weight Loss: No        Nutrition Significant Labs:  CMP Trend:    Recent Labs     03/25/25  0906 12/20/23  0955 10/27/22  1242   GLUCOSE 97 99 84    139 140   K 4.4 4.2 4.3    107 105   CO2 26 23 25   ANIONGAP 11 13 14   BUN 18 27* 25*   CREATININE 0.99 1.06 0.96   EGFR 87 81  --    CALCIUM 8.8 8.9 9.1   ALBUMIN 4.2 4.3 4.8   ALKPHOS 53 41 41   PROT 6.6 6.6 7.4   AST 31 38 31   BILITOT 0.5 0.4 0.5   ALT 37 46 41   , CBC Trend:   Recent Labs     03/25/25  0906 12/20/23  0955 10/27/22  1242   WBC 6.2 4.8 4.9   NRBC 0.0 0.0  --    RBC 5.04 5.02 5.45   HGB 15.3 15.4 16.8   HCT 44.8 46.0 51.5   MCV 89 92 94   MCH 30.4 30.7  --    MCHC 34.2 33.5 32.6   RDW 13.7 13.7 13.1    173 187   , DM Specific Labs Trend (Includes HgbA1C, antibodies & fasting insulin):   Recent Labs     03/25/25  0906 12/20/23  0955 12/20/23  0955 10/27/22  1242   HGBA1C 5.5  --  5.5 6.0*   INSULFAST 36*   < > 38* 19    < > = values in this interval not displayed.   , Lipid Panel Trend:    Recent Labs     03/25/25  0906 12/20/23  0955 10/27/22  1242 12/13/21  0946 11/06/20  1019   CHOL 153 155 140 188 104   HDL 27.8 26.7 29.2* 28.8* 26.8*   LDLCALC 96 94  --   --   --    LDLF  --   --  75 121* 62   VLDL 29 34 35 38 15   TRIG 145 171* 177* 189* 74   , Iron Panel + Serum Ferritin Trend:   Recent Labs     03/25/25  0906 12/20/23  0955 10/27/22  1242   IRON 83 93 90   UIBC 224 247  --    TIBC 307 340 373   IRONSAT 27 27 24*   FERRITIN 405* 298 284   , Vitamin B12:   Lab Results   Component Value Date    EWTURXSR92 693 03/25/2025     , Vitamin D:   Lab Results   Component Value Date    VITD25 63 03/25/2025    , and CRP Trend (last 3):   Lab Results   Component Value Date    HSCRP 5.9 (H) 03/25/2025    HSCRP 1.3 12/20/2023    HSCRP 0.8 10/27/2022        Medications:  Current Outpatient Medications   Medication Instructions    apixaban (ELIQUIS) 5 mg, oral, 2 times daily    aspirin 81 mg EC tablet Daily RT    atorvastatin (LIPITOR) 10 mg, oral, Daily    cholecalciferol (VITAMIN D3) 5,000 Units, oral, Daily    famotidine (Pepcid) 20 mg tablet Daily RT        Estimated Needs:  Total Energy Estimated Needs in 24 hours (kCal): 2461 kCal; Method for Estimating Needs: [id number] x AF 1.3  Total Protein Estimated Needs in 24 Hours (g): 100 g; Method for Estimating 24 Hour Protein Needs: .7 g/kg DBW    Total Fluid Estimated Needs in 24 Hours (mL): 62 mL (fluid ounces); Method for Estimating 24 Hour Fluid Needs: 25 ml/kg adj wt     Patient on Order Fluid Restriction: No  Total Fiber Estimated Needs (g):  (30-35 g)         Nutrition Diagnosis   Malnutrition Diagnosis  Patient has Malnutrition Diagnosis: No    Nutrition Diagnosis  Patient has Nutrition Diagnosis: Yes  Diagnosis Status (1): New  Nutrition Diagnosis 1: Obesity, adult or pediatric  Related to (1): dietary and/or physiological factors despite ongoing use of Ozempic  As Evidenced by (1): BMI 39.59 and no weight loss over the past year  Additional Nutrition Diagnosis: Diagnosis 3  Diagnosis Status (2): New  Nutrition Diagnosis 2: Altered nutrition related to laboratory values  Related to (2): related lab values  related to lifestyle, dietary and/or hereditary factors  As Evidenced by (2): low serum HDL cholesterol  Diagnosis Status (3): New  Nutrition Diagnosis 3: Inadequate fiber intake  Related to (3): intake from food sources low intake of fruits, vegetables, whole grains  As Evidenced by (3): evidenced by 3-day diet history provided       Nutrition Interventions/Recommendations   Nutrition  Prescription: Oral nutrition General Heart Healthy diet focusing on increased fiber intake with consistent 3-meal/day patternand creating calorie defecit for weight loss    Nutrition Interventions:   Food and Nutrient Delivery: Meals & Snacks: Energy-modified diet, Fiber-modified diet, General Healthful Diet, Modification of schedule of oral intake     Coordination of Care:       Nutrition Education:   Nutrition Education Content: Content related nutrition education  Discussed ways to increase fiber, vitamins and minerals by enlarging variety of foods consumed, discussed promoting weight loss, increasig fluid intake, increasing itensity of physical activity and methodology to promote healthy lipid profile    Nutrition Recommendations:   1) To help promote weight loss, work towards selecting lower fat protein foods such as white, skinless poultry, fish, low fat/fat free dairy, eggs, etc instead of beef, veal or pork.  When you do have a higher fat protein, consider a smaller portion and have a side of a protein-rich plant based food such as lentils or beans, quinoa, tofu--for instance, have a salad with egg white, marsha seeds, pumpkin seeds, garbanzo beans, lima beans, peas, soybeans (edamame), tofu or tempeh or a low fat cheese or cottage cheese as nutrient/protein-rich ingredients.  Also work to increase intensity of your physical activity.  You may replace some of the milder exercise days with additional Pelaton workouts or simply increase resistance and elevation in elliptical workouts.     2)  Weight loss should help decrease HDL cholesterol and reduce your cardio-vascular risk and risks of developing other diseases. To help increase HDL cholesterol focus on healthy mono- or poly-unsaturated fats such as olive, peanut or avocado oil or safflower, sunflower, or sesame oils. Work on including more foods rich in Omega-3 fats such as salmon, mackerel, sardines (fatty fish), soy foods, flax seeds, walnuts, soybeans  and canola oil.  (Handout provided).  Limit alcohol intake and increase physical activity in general or increase intensity of workouts.  Increase your intake of foods that are high in fiber,      3)  Although you take a fiber supplement, you would benefit from eating a variety of foods high in fiber.  This will not only provide a better variety of vitamins and minerals, but it will provide a mixture of soluble and insoluble fibers along with improving the variety of microbes in your gut microbiome (which may actually be functional in weight loss).  It is recommended that you get 30-35 grams of fiber daily.  Start by including a generous serving of non-starchy vegetables at meals and add fruit to breakfast or snack.  Enjoy higher fiber starches in modest portions such as brown or wild rice, barley, corn, sweet potato, starchy beans, lentils, etc.       4)  In order to get in all the nutrients you need, you should consistently be eating 3 meals daily and consider snacks of nutrient rich foods such as yogurt with nuts, berries or a little high fiber cereal, fruit with cheese or nuts of peanut butter, veggies with hummus.  Since you did not mention many dairy products your calcium intake is below guidelines.  You may add Greek yogurt, low fat cheeses, low fat milk or plant based milks fortified with calcium to your daily intake or consider taking a 500 mg calcium supplement such as Viactive or Citracal or a general calcium citrate supplement.       5)  Recommend reducing the dietary supplements.  Recommend taking Niacin, a Muiltivitamin-mineral supplement, Fish Oil 1200 mg, Melatonin, and calcium-500 mg    6)  Due to relatively high serum vitamin D, you may discontinue current Vitamin D supplementation .  Taking a general Multivitamin will provide adequate vitamin D at this time and also provide additional vitamins and minerals that have been missing in your diet.    Educational Handouts Provided: Meat Counter; Healthy  Omega-3 fats; Fiber content of Foods    Nutrition Counseling:   Nutrition Counseling Strategies : Nutrition counseling based on goal setting strategy       Nutrition Monitoring and Evaluation   Food and Nutrient Intake  Monitoring and Evaluation Plan: Meal/snack pattern  Meal/Snack Pattern: Estimated meal and snack pattern  Criteria: Monitor patient's progress towards personal nutrition goal(s)         Anthropometric measurements  Monitoring and Evaluation Plan: Weight  Criteria: Monitor patient's progress towards intentional weight loss of 0.5-2 lb per week, trending toward a clinically significant weight loss of 5-10% of current body weight.    Biochemical Data, Medical Tests and Procedures  Monitoring and Evaluation Plan: Lipid profile  Lipid Profile: Triglycerides, serum  Criteria: CHOL <200;  HDL 40-60;  LDL <100;  TG <150 mg/dL         Goal Status: Goal Status: New goal identified         Follow Up: annually

## 2025-03-27 ENCOUNTER — TELEPHONE (OUTPATIENT)
Facility: CLINIC | Age: 60
End: 2025-03-27
Payer: COMMERCIAL

## 2025-03-27 DIAGNOSIS — Z12.11 COLON CANCER SCREENING: ICD-10-CM

## 2025-03-27 DIAGNOSIS — R91.8 HILAR ENLARGEMENT: Primary | ICD-10-CM

## 2025-03-27 LAB — LPA SERPL-MCNC: 32 MG/DL

## 2025-03-27 RX ORDER — POLYETHYLENE GLYCOL 3350, SODIUM SULFATE ANHYDROUS, SODIUM BICARBONATE, SODIUM CHLORIDE, POTASSIUM CHLORIDE 236; 22.74; 6.74; 5.86; 2.97 G/4L; G/4L; G/4L; G/4L; G/4L
POWDER, FOR SOLUTION ORAL
Qty: 4000 ML | Refills: 0 | Status: SHIPPED | OUTPATIENT
Start: 2025-03-27

## 2025-03-27 NOTE — TELEPHONE ENCOUNTER
Telephone call today regarding abnormal CXR report-patient denies any cough, chest pain, wheezing, shortness of breath, fever.  He is going out of town until April 16 and we will schedule the CCT when he returns.  He will call me if any symptoms occur in the interim.

## 2025-03-30 LAB
TESTOSTERONE FREE (CHAN): 54.9 PG/ML (ref 35–155)
TESTOSTERONE,TOTAL,LC-MS/MS: 305 NG/DL (ref 250–1100)

## 2025-04-04 DIAGNOSIS — R06.83 SNORING: Primary | ICD-10-CM

## 2025-04-17 ENCOUNTER — APPOINTMENT (OUTPATIENT)
Dept: PHARMACY | Facility: HOSPITAL | Age: 60
End: 2025-04-17
Payer: COMMERCIAL

## 2025-04-17 ENCOUNTER — APPOINTMENT (OUTPATIENT)
Dept: ORTHOPEDIC SURGERY | Facility: CLINIC | Age: 60
End: 2025-04-17
Payer: COMMERCIAL

## 2025-04-17 DIAGNOSIS — E78.5 HYPERLIPIDEMIA, UNSPECIFIED HYPERLIPIDEMIA TYPE: ICD-10-CM

## 2025-04-17 DIAGNOSIS — E66.9 OBESITY (BMI 30-39.9): ICD-10-CM

## 2025-04-17 RX ORDER — TIRZEPATIDE 5 MG/.5ML
5 INJECTION, SOLUTION SUBCUTANEOUS
Qty: 2 ML | Refills: 0 | Status: SHIPPED | OUTPATIENT
Start: 2025-04-17

## 2025-04-17 NOTE — PROGRESS NOTES
Clinical Pharmacy Appointment    Patient ID: Rubio Huerta is a 59 y.o. male who presents for weight loss management.     Pt is here for First appointment.     Referring Provider: Eugenio Allan MD  PCP: Eugenio Allan MD   Last visit with PCP: 3/25/2025   Next visit with PCP: needs to be scheduled    Subjective   Medication Reconciliation:  Changed: none  Added: none  Discontinued:   Aspirin 81 mg  Golytely     Drug Interactions  No relevant drug interactions were noted.    Medication System Management  Patient's preferred pharmacy: [facility] - [place], [place]  Adherence/Organization: none  Affordability/Accessibility: none      WEIGHT LOSS  BMI Readings from Last 3 Encounters:   03/26/25 39.61 kg/m²   03/25/25 39.59 kg/m²   01/08/25 39.16 kg/m²      Starting weight: 249 lbs.      Lifestyle  Diet: 3 meals/day.   BK: eggs, protein  LN: sandwich, soup, salad  DN: 8oz of protein, vegetables, starch  Snacks: beef sticks, olives, nuts  Drinks: coffee, water  Has worked with nutritionist/dietician? Yes, patient found it to be helpful   Exercise: exercises at least 3 times a week  Activity type: Type of Exercise : 30-45 mins on the elliptical or stationary bike    Pertinent PMH Review:  PMH of Pancreatitis: No  PMH of Retinopathy: No  PMH of MTC: No    Non-Pharmacological Therapy  Weight loss techniques attempted:  Mediterranean diet   Followed diet plan from nutritionist     Pharmacological Therapy  Current Medications: N/A  Previous Medications:   Compounded Semaglutide 2.6 mg  Last dose was 3 weeks ago   Tolerated medication well    Insurance coverage of weight-loss medications? Not covered  Eligible for copay cards/programs? Yes, patient has commercial  Eligible for  PAP? No, medication not covered by patient's insurance       Objective   Allergies[1]  Social History     Social History Narrative    Not on file      Medication Review  Current Outpatient Medications   Medication Instructions     "apixaban (ELIQUIS) 5 mg, oral, 2 times daily    aspirin 81 mg EC tablet Daily RT    atorvastatin (LIPITOR) 10 mg, oral, Daily    cholecalciferol (VITAMIN D3) 5,000 Units, Daily    famotidine (Pepcid) 20 mg tablet Daily RT    polyethylene glycol (GoLYTELY) 236-22.74-6.74 -5.86 gram solution Drink 1/2 starting at 6 pm the night before your procedure then drink the 2nd 1/2 5 hours before procedure arrival time    Zepbound 5 mg, subcutaneous, Every 7 days      Vitals  BP Readings from Last 2 Encounters:   03/25/25 132/78   12/20/23 118/76     BMI Readings from Last 1 Encounters:   03/26/25 39.61 kg/m²      Labs  A1C  Lab Results   Component Value Date    HGBA1C 5.5 03/25/2025    HGBA1C 5.5 12/20/2023    HGBA1C 6.0 (A) 10/27/2022     BMP  Lab Results   Component Value Date    CALCIUM 8.8 03/25/2025     03/25/2025    K 4.4 03/25/2025    CO2 26 03/25/2025     03/25/2025    BUN 18 03/25/2025    CREATININE 0.99 03/25/2025    EGFR 87 03/25/2025     LFTs  Lab Results   Component Value Date    ALT 37 03/25/2025    AST 31 03/25/2025    ALKPHOS 53 03/25/2025    BILITOT 0.5 03/25/2025     FLP  Lab Results   Component Value Date    TRIG 145 03/25/2025    CHOL 153 03/25/2025    LDLF 75 10/27/2022    LDLCALC 96 03/25/2025    HDL 27.8 03/25/2025     Urine Microalbumin  No results found for: \"MICROALBCREA\"  Weight Management  Wt Readings from Last 3 Encounters:   03/26/25 113 kg (249 lb 1.9 oz)   03/25/25 113 kg (249 lb)   01/08/25 113 kg (250 lb)      There is no height or weight on file to calculate BMI.     Assessment/Plan   Problem List Items Addressed This Visit       Obesity (BMI 30-39.9)    Patient was referred to the clinical pharmacy team for weight loss management. Patient was previously on compounded Semaglutide, however PCP recommended switching to Zepbound for additional weight loss and health benefits. Patient's last dose of Semaglutide was 3 weeks ago and patient reported that he tolerated it well. Discussed " MOA, side effects, administration, and dosing of Zepbound. Discussed with patient starting Zepbound 5 mg as patient was on Semaglutide 2.6 mg. Patient was agreeable.     PLAN   START Zepbound 5 mg once weekly. Prescription sent to [facility].          Relevant Medications    tirzepatide, weight loss, (Zepbound) 5 mg/0.5 mL solution    Other Relevant Orders    Referral to Clinical Pharmacy     Other Visit Diagnoses         Hyperlipidemia, unspecified hyperlipidemia type        Relevant Medications    tirzepatide, weight loss, (Zepbound) 5 mg/0.5 mL solution    Other Relevant Orders    Referral to Clinical Pharmacy            Clinical Pharmacist follow-up: 3 weeks, Telehealth visit  Patient is not followed in CCM. (If yes, track minutes under compass supa at each visit)    Continue all meds under the continuation of care with the referring provider and clinical pharmacy team.    Thank you,  Cindy Romero, PharmD.   PGY-1 Pharmacy Resident   [phone]    Verbal consent to manage patient's drug therapy was obtained from the patient. They were informed they may decline to participate or withdraw from participation in pharmacy services at any time.         [1] No Known Allergies

## 2025-04-17 NOTE — ASSESSMENT & PLAN NOTE
Patient was referred to the clinical pharmacy team for weight loss management. Patient was previously on compounded Semaglutide, however PCP recommended switching to Zepbound for additional weight loss and health benefits. Patient's last dose of Semaglutide was 3 weeks ago and patient reported that he tolerated it well. Discussed MOA, side effects, administration, and dosing of Zepbound. Discussed with patient starting Zepbound 5 mg as patient was on Semaglutide 2.6 mg. Patient was agreeable.     PLAN   START Zepbound 5 mg once weekly. Prescription sent to [facility] pharmacy.

## 2025-05-01 ENCOUNTER — APPOINTMENT (OUTPATIENT)
Dept: RADIOLOGY | Facility: HOSPITAL | Age: 60
End: 2025-05-01
Payer: COMMERCIAL

## 2025-05-01 ENCOUNTER — OFFICE VISIT (OUTPATIENT)
Dept: ORTHOPEDIC SURGERY | Facility: CLINIC | Age: 60
End: 2025-05-01
Payer: COMMERCIAL

## 2025-05-01 ENCOUNTER — HOSPITAL ENCOUNTER (OUTPATIENT)
Dept: RADIOLOGY | Facility: CLINIC | Age: 60
Discharge: HOME | End: 2025-05-01
Payer: COMMERCIAL

## 2025-05-01 VITALS — BODY MASS INDEX: 39.59 KG/M2 | WEIGHT: 249 LBS

## 2025-05-01 DIAGNOSIS — M76.829 PTTD (POSTERIOR TIBIAL TENDON DYSFUNCTION): ICD-10-CM

## 2025-05-01 DIAGNOSIS — D16.9 OSTEOMA: ICD-10-CM

## 2025-05-01 DIAGNOSIS — R91.8 HILAR ENLARGEMENT: ICD-10-CM

## 2025-05-01 DIAGNOSIS — M19.071 ARTHRITIS OF RIGHT FOOT: Primary | ICD-10-CM

## 2025-05-01 DIAGNOSIS — M79.671 RIGHT FOOT PAIN: ICD-10-CM

## 2025-05-01 DIAGNOSIS — M1A.9XX1 CHRONIC GOUT INVOLVING TOE OF LEFT FOOT WITH TOPHUS, UNSPECIFIED CAUSE: ICD-10-CM

## 2025-05-01 DIAGNOSIS — M19.079 ARTHRITIS OF SUBTALAR JOINT: ICD-10-CM

## 2025-05-01 DIAGNOSIS — M21.41 ACQUIRED PES PLANUS OF RIGHT FOOT: ICD-10-CM

## 2025-05-01 PROCEDURE — 2550000001 HC RX 255 CONTRASTS: Performed by: INTERNAL MEDICINE

## 2025-05-01 PROCEDURE — 99214 OFFICE O/P EST MOD 30 MIN: CPT | Performed by: ORTHOPAEDIC SURGERY

## 2025-05-01 PROCEDURE — 99204 OFFICE O/P NEW MOD 45 MIN: CPT | Performed by: ORTHOPAEDIC SURGERY

## 2025-05-01 PROCEDURE — 71260 CT THORAX DX C+: CPT

## 2025-05-01 PROCEDURE — 73630 X-RAY EXAM OF FOOT: CPT | Mod: RT

## 2025-05-01 RX ADMIN — IOHEXOL 75 ML: 350 INJECTION, SOLUTION INTRAVENOUS at 12:38

## 2025-05-01 ASSESSMENT — PAIN SCALES - GENERAL: PAINLEVEL_OUTOF10: 0-NO PAIN

## 2025-05-01 NOTE — PROGRESS NOTES
Returns for both feet.  Had some questions about his left great toe from a couple years ago.  Does not have a lot of pain and has had pretty stable gout over the last few years.  Some questions about recovery from surgery if he had his gouty tophus removed.  Biggest complaint of pain is around the right fifth metatarsal head.  Is having difficulty finding comfortable shoes.  Is a very wide foot and flatfoot has become worse over time.    Exam: Obvious go to gouty tophi over left great toe IP joint and MTP joint.  No pain with IP joint or MTP motion on the left.  Neutral alignment of left arch.  Right foot shows abducted forefoot with hindfoot valgus.  Correctable deformity.  Tender over the fifth metatarsal head laterally.  Correctable deformity of the hindfoot.  Minimal functioning right PT tendon.  Nontender lateral tibiotalar joint.    I personally reviewed the following radiographic exams: Right foot shows degenerative arthritis of the hindfoot with peritalar subluxation.  No acute changes.  Normal-appearing fifth metatarsal head.  Previous x-ray of the left foot shows.  Articular erosion of the IP joint and MTP joint.    Assessment: Gouty tophi left great toe.  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    Plan: Discussed nonoperative and operative options in detail.   Risk and benefits discussed in detail. All questions answered today.  Recovery timeline and expectations discussed in detail.  Will need a new x-ray of his left foot to really look at the articulation before consider anything surgical.  We discussed possible recovery from surgical removal of the gouty tophi of the left great toe though without significant pain I am not sure I would encourage this at this time.  I think his biggest problem is his right foot which is really related to his dysfunctional PTT tendon and collapse of his arch.  I think his bunionette pain is really  related to his hindfoot deformity and collapse.  I recommend an MRI of the right ankle to evaluate the hindfoot joints and consideration of possible triple arthrodesis.  Can consider shaving down the bunionette at that time if necessary.

## 2025-05-07 ENCOUNTER — APPOINTMENT (OUTPATIENT)
Dept: GASTROENTEROLOGY | Facility: EXTERNAL LOCATION | Age: 60
End: 2025-05-07
Payer: COMMERCIAL

## 2025-05-07 NOTE — PROGRESS NOTES
Clinical Pharmacy Appointment    Patient ID: Rubio Huerta is a 59 y.o. male who presents for weight loss management.     Pt is here for First appointment.     Referring Provider: Eugenio Allan MD  PCP: Eugenio Allan MD   Last visit with PCP: 3/25/2025   Next visit with PCP: needs to be scheduled    Subjective   Medication Reconciliation:  Changed: none  Added: none  Discontinued:   Aspirin 81 mg  Golytely     Drug Interactions  No relevant drug interactions were noted.    Medication System Management  Patient's preferred pharmacy: [facility] - [place], [place]  Adherence/Organization: none  Affordability/Accessibility: none      WEIGHT LOSS  BMI Readings from Last 3 Encounters:   05/01/25 39.59 kg/m²   03/26/25 39.61 kg/m²   03/25/25 39.59 kg/m²      Starting weight: 249 lbs.  Current weight: 240 lbs      Lifestyle  Diet: 3 meals/day.   BK: eggs, protein  LN: sandwich, soup, salad  DN: 8oz of protein, vegetables, starch  Snacks: beef sticks, olives, nuts  Drinks: coffee, water  Has worked with nutritionist/dietician? Yes, patient found it to be helpful   Exercise: exercises at least 3 times a week  Activity type: Type of Exercise : 30-45 mins on the elliptical or stationary bike    Pertinent PMH Review:  PMH of Pancreatitis: No  PMH of Retinopathy: No  PMH of MTC: No    Non-Pharmacological Therapy  Weight loss techniques attempted:  Mediterranean diet   Followed diet plan from nutritionist     Pharmacological Therapy  Current Medications: Compounded Semaglutide 2.6 mg  No side effects  Previous Medications:       Insurance coverage of weight-loss medications? Not covered  Eligible for copay cards/programs? Yes, patient has commercial  Eligible for  PAP? No, medication not covered by patient's insurance       Objective   Allergies[1]  Social History     Social History Narrative    Not on file      Medication Review  Current Outpatient Medications   Medication Instructions    apixaban (ELIQUIS) 5  "mg, oral, 2 times daily    aspirin 81 mg EC tablet Daily RT    atorvastatin (LIPITOR) 10 mg, oral, Daily    cholecalciferol (VITAMIN D3) 5,000 Units, Daily    famotidine (Pepcid) 20 mg tablet Daily RT    polyethylene glycol (GoLYTELY) 236-22.74-6.74 -5.86 gram solution Drink 1/2 starting at 6 pm the night before your procedure then drink the 2nd 1/2 5 hours before procedure arrival time    Zepbound 5 mg, subcutaneous, Every 7 days      Vitals  BP Readings from Last 2 Encounters:   03/25/25 132/78   12/20/23 118/76     BMI Readings from Last 1 Encounters:   05/01/25 39.59 kg/m²      Labs  A1C  Lab Results   Component Value Date    HGBA1C 5.5 03/25/2025    HGBA1C 5.5 12/20/2023    HGBA1C 6.0 (A) 10/27/2022     BMP  Lab Results   Component Value Date    CALCIUM 8.8 03/25/2025     03/25/2025    K 4.4 03/25/2025    CO2 26 03/25/2025     03/25/2025    BUN 18 03/25/2025    CREATININE 0.99 03/25/2025    EGFR 87 03/25/2025     LFTs  Lab Results   Component Value Date    ALT 37 03/25/2025    AST 31 03/25/2025    ALKPHOS 53 03/25/2025    BILITOT 0.5 03/25/2025     FLP  Lab Results   Component Value Date    TRIG 145 03/25/2025    CHOL 153 03/25/2025    LDLF 75 10/27/2022    LDLCALC 96 03/25/2025    HDL 27.8 03/25/2025     Urine Microalbumin  No results found for: \"MICROALBCREA\"  Weight Management  Wt Readings from Last 3 Encounters:   05/01/25 113 kg (249 lb)   03/26/25 113 kg (249 lb 1.9 oz)   03/25/25 113 kg (249 lb)      There is no height or weight on file to calculate BMI.     Assessment/Plan   Problem List Items Addressed This Visit       Obesity (BMI 30-39.9)    Patient was referred to the clinical pharmacy team for weight loss management. Patient is currently on compounded Semaglutide, however PCP recommended switching to Zepbound for additional weight loss and health benefits. Patient is completing last week of compounded Semaglutide and patient reported no side effects. Discussed with patient starting " Zepbound 5 mg 1 week after last dose of Semaglutide. Patient was agreeable.     PLAN   START Zepbound 5 mg once weekly. Prescription sent to [facility] pharmacy.           Other Visit Diagnoses         Hyperlipidemia, unspecified hyperlipidemia type                  Clinical Pharmacist follow-up: 4 weeks, Telehealth visit  Patient is not followed in CCM. (If yes, track minutes under compass supa at each visit)    Continue all meds under the continuation of care with the referring provider and clinical pharmacy team.    Thank you,  Cindy Romero, PharmD.   PGY-1 Pharmacy Resident   [phone]    Verbal consent to manage patient's drug therapy was obtained from the patient. They were informed they may decline to participate or withdraw from participation in pharmacy services at any time.         [1] No Known Allergies

## 2025-05-08 ENCOUNTER — APPOINTMENT (OUTPATIENT)
Dept: PHARMACY | Facility: HOSPITAL | Age: 60
End: 2025-05-08
Payer: COMMERCIAL

## 2025-05-08 DIAGNOSIS — E78.5 HYPERLIPIDEMIA, UNSPECIFIED HYPERLIPIDEMIA TYPE: ICD-10-CM

## 2025-05-08 DIAGNOSIS — E66.9 OBESITY (BMI 30-39.9): ICD-10-CM

## 2025-05-08 NOTE — ASSESSMENT & PLAN NOTE
Patient was referred to the clinical pharmacy team for weight loss management. Patient is currently on compounded Semaglutide, however PCP recommended switching to Zepbound for additional weight loss and health benefits. Patient is completing last week of compounded Semaglutide and patient reported no side effects. Discussed with patient starting Zepbound 5 mg 1 week after last dose of Semaglutide. Patient was agreeable.     PLAN   START Zepbound 5 mg once weekly. Prescription sent to [facility] pharmacy.

## 2025-05-08 NOTE — PROGRESS NOTES
I reviewed the progress note and agree with the resident’s findings and plans as written. Case discussed with resident.    Alfonso Gudino, PharmD

## 2025-05-12 ENCOUNTER — HOSPITAL ENCOUNTER (OUTPATIENT)
Dept: RADIOLOGY | Facility: CLINIC | Age: 60
Discharge: HOME | End: 2025-05-12
Payer: COMMERCIAL

## 2025-05-12 DIAGNOSIS — M76.829 PTTD (POSTERIOR TIBIAL TENDON DYSFUNCTION): ICD-10-CM

## 2025-05-12 DIAGNOSIS — M1A.9XX1 CHRONIC GOUT INVOLVING TOE OF LEFT FOOT WITH TOPHUS, UNSPECIFIED CAUSE: ICD-10-CM

## 2025-05-12 DIAGNOSIS — M19.079 ARTHRITIS OF SUBTALAR JOINT: ICD-10-CM

## 2025-05-12 PROCEDURE — 73721 MRI JNT OF LWR EXTRE W/O DYE: CPT | Mod: RT

## 2025-05-29 ENCOUNTER — APPOINTMENT (OUTPATIENT)
Dept: GASTROENTEROLOGY | Facility: EXTERNAL LOCATION | Age: 60
End: 2025-05-29
Payer: COMMERCIAL

## 2025-06-02 ENCOUNTER — APPOINTMENT (OUTPATIENT)
Dept: PHARMACY | Facility: HOSPITAL | Age: 60
End: 2025-06-02
Payer: COMMERCIAL

## 2025-06-16 ENCOUNTER — APPOINTMENT (OUTPATIENT)
Dept: PHARMACY | Facility: HOSPITAL | Age: 60
End: 2025-06-16
Payer: COMMERCIAL

## 2025-06-19 ENCOUNTER — APPOINTMENT (OUTPATIENT)
Dept: GASTROENTEROLOGY | Facility: EXTERNAL LOCATION | Age: 60
End: 2025-06-19
Payer: COMMERCIAL

## 2025-07-11 ENCOUNTER — APPOINTMENT (OUTPATIENT)
Dept: PHARMACY | Facility: HOSPITAL | Age: 60
End: 2025-07-11
Payer: COMMERCIAL

## 2025-08-06 ENCOUNTER — APPOINTMENT (OUTPATIENT)
Dept: GASTROENTEROLOGY | Facility: EXTERNAL LOCATION | Age: 60
End: 2025-08-06
Payer: COMMERCIAL